# Patient Record
Sex: FEMALE | Race: WHITE | NOT HISPANIC OR LATINO | Employment: FULL TIME | URBAN - METROPOLITAN AREA
[De-identification: names, ages, dates, MRNs, and addresses within clinical notes are randomized per-mention and may not be internally consistent; named-entity substitution may affect disease eponyms.]

---

## 2023-08-25 ENCOUNTER — TELEPHONE (OUTPATIENT)
Dept: PRIMARY CARE | Facility: CLINIC | Age: 38
End: 2023-08-25

## 2023-08-25 DIAGNOSIS — Z00.00 ROUTINE GENERAL MEDICAL EXAMINATION AT A HEALTH CARE FACILITY: Primary | ICD-10-CM

## 2023-08-25 DIAGNOSIS — E55.9 VITAMIN D DEFICIENCY: ICD-10-CM

## 2023-09-25 ENCOUNTER — HOSPITAL ENCOUNTER (OUTPATIENT)
Dept: DATA CONVERSION | Facility: HOSPITAL | Age: 38
Discharge: HOME | End: 2023-09-25

## 2023-09-25 DIAGNOSIS — Z00.00 ENCOUNTER FOR GENERAL ADULT MEDICAL EXAMINATION WITHOUT ABNORMAL FINDINGS: ICD-10-CM

## 2023-09-25 DIAGNOSIS — E55.9 VITAMIN D DEFICIENCY, UNSPECIFIED: ICD-10-CM

## 2023-09-25 LAB
25(OH)D3 SERPL-MCNC: 31 NG/ML (ref 31–100)
ALBUMIN SERPL-MCNC: 4.4 GM/DL (ref 3.5–5)
ALBUMIN/GLOB SERPL: 1.4 RATIO (ref 1.5–3)
ALP BLD-CCNC: 59 U/L (ref 35–125)
ALT SERPL-CCNC: 21 U/L (ref 5–40)
ANION GAP SERPL CALCULATED.3IONS-SCNC: 9 MMOL/L (ref 0–19)
APPEARANCE PLAS: ABNORMAL
AST SERPL-CCNC: 18 U/L (ref 5–40)
BILIRUB SERPL-MCNC: 0.4 MG/DL (ref 0.1–1.2)
BUN SERPL-MCNC: 15 MG/DL (ref 8–25)
BUN/CREAT SERPL: 21.4 RATIO (ref 8–21)
CALCIUM SERPL-MCNC: 9.7 MG/DL (ref 8.5–10.4)
CHLORIDE SERPL-SCNC: 104 MMOL/L (ref 97–107)
CHOLEST SERPL-MCNC: 169 MG/DL (ref 133–200)
CHOLEST/HDLC SERPL: 3.6 RATIO
CO2 SERPL-SCNC: 26 MMOL/L (ref 24–31)
COLOR SPUN FLD: ABNORMAL
CREAT SERPL-MCNC: 0.7 MG/DL (ref 0.4–1.6)
DEPRECATED RDW RBC AUTO: 43 FL (ref 37–54)
ERYTHROCYTE [DISTWIDTH] IN BLOOD BY AUTOMATED COUNT: 12.5 % (ref 11.7–15)
FASTING STATUS PATIENT QL REPORTED: ABNORMAL
GFR SERPL CREATININE-BSD FRML MDRD: 116 ML/MIN/1.73 M2
GLOBULIN SER-MCNC: 3.2 G/DL (ref 1.9–3.7)
GLUCOSE SERPL-MCNC: 97 MG/DL (ref 65–99)
HBA1C MFR BLD: 5.4 % (ref 4–6)
HCT VFR BLD AUTO: 42.5 % (ref 36–44)
HDLC SERPL-MCNC: 47 MG/DL
HGB BLD-MCNC: 13.9 GM/DL (ref 12–15)
LDLC SERPL CALC-MCNC: 107 MG/DL (ref 65–130)
MCH RBC QN AUTO: 30.2 PG (ref 26–34)
MCHC RBC AUTO-ENTMCNC: 32.7 % (ref 31–37)
MCV RBC AUTO: 92.2 FL (ref 80–100)
PLATELET # BLD AUTO: 301 K/UL (ref 150–450)
PMV BLD AUTO: 10.2 CU (ref 7–12.6)
POTASSIUM SERPL-SCNC: 4.5 MMOL/L (ref 3.4–5.1)
PROT SERPL-MCNC: 7.6 G/DL (ref 5.9–7.9)
RBC # BLD AUTO: 4.61 M/UL (ref 4–4.9)
SODIUM SERPL-SCNC: 139 MMOL/L (ref 133–145)
TRIGL SERPL-MCNC: 75 MG/DL (ref 40–150)
TSH SERPL DL<=0.05 MIU/L-ACNC: 1.03 MIU/L (ref 0.27–4.2)
WBC # BLD AUTO: 5.5 K/UL (ref 4.5–11)

## 2023-09-27 NOTE — PROGRESS NOTES
"Subjective   Patient ID: Norah Khan is a 37 y.o. female who presents for Annual Exam.    Last Annual Physical: Sept 2022   Last Dental Visit: Scheduled   Last Eye exam: Up-to-date   Hearing Concerns: No  Diet: Well-balanced   Exercise Routine: Regular exercise       HPI   The patient reports that she is taking Prozac for OCD as prescribed and she has no issues with this medication. She is also taking a prenatal supplement as she is trying to conceive.     Review of Systems  Constitutional: No fever or chills, No Night Sweats  Eyes: No Blurry Vision or Eye sight problems  ENT: No Nasal Discharge, Hoarseness, sore throat  Cardiovascular: no chest pain, no palpitations and no syncope.   Respiratory: no cough, no shortness of breath during exertion and no shortness of breath at rest.   Gastrointestinal: no abdominal pain, no nausea and no vomiting.   : No vaginal discharge, burning with urination, no blood in urine or stools  Skin: No Skin rashes or Lesions  Neuro: No Headache, no dizziness or Numbness or tingling  Psych: No Anxiety, depression or sleeping problems  Heme: No Easy bleeding or brusing.     Objective   /79 (BP Location: Left arm, Patient Position: Sitting, BP Cuff Size: Adult)   Pulse 79   Resp 16   Ht 1.651 m (5' 5\")   Wt 66.7 kg (147 lb)   LMP 09/16/2023 (Exact Date)   SpO2 97%   BMI 24.46 kg/m²     Physical Exam  Patient declined Chaperone  Constitutional: Alert and in no acute distress. Well developed, well nourished.   Head and Face: Head and face: Normal.    Eyes: Normal external exam. Pupils were equal in size, round, reactive to light (PERRL) with normal accommodation and extraocular movements intact (EOMI).   Ears, Nose, Mouth, and Throat: External inspection of ears and nose: Normal.  Hearing: Normal.  Nasal mucosa, septum, and turbinates: Normal.  Lips, teeth, and gums: Normal.  Oropharynx: Normal.   Neck: No neck mass was observed. Supple. Thyroid not enlarged and there " were no palpable thyroid nodules.   Cardiovascular: Heart rate and rhythm were normal, normal S1 and S2. Pedal pulses: Normal. No peripheral edema.   Pulmonary: No respiratory distress. Clear bilateral breath sounds.   Breast: Normal Appearance, No Masses or lumps palpated  Abdomen: Soft nontender; no abdominal mass palpated. Normal bowel sounds. No organomegaly.   Musculoskeletal: No joint swelling seen, normal movements of all extremities. Range of motion: Normal.  Muscle strength/tone: Normal.    Skin: Normal skin color and pigmentation, normal skin turgor, and no rash.   Neurologic: Deep tendon reflexes were 2+ and symmetric.   Psychiatric: Judgment and insight: Intact. Mood and affect: Normal.  Lymphatic: No cervical lymphadenopathy. Palpation of lymph nodes in axillae: Normal.  Palpation of lymph nodes in groin: Normal.    Lab Results   Component Value Date    WBC 7.6 05/05/2022    HGB 13.4 05/05/2022    HCT 40.2 05/05/2022     (L) 05/05/2022           Assessment/Plan   Diagnoses and all orders for this visit:  Annual physical exam  Encounter for immunization  -     Flu vaccine (IIV4) age 6 months and greater, preservative free        Dear Norah Khan     It was my pleasure to take care of you today in the office. Below are the things we discussed today:    1. 1. Immunizations: Yearly Flu shot is recommended. Given today         a: COVID: Please get booster from the pharmacy          b: Tetanus: Up-to-date    2. Blood Work: Awaiting results   3. Seen your dentist twice a year  4. Yearly Eye exam is recommended    5. BMI: Normal  6: Diet recommendations:   Eat Clean, Try to have as many home cooked meals as possible  Avoid processed foods which contain excess calories, sugar, and sodium.    7. Exercise recommendations:   150 minutes a week to maintain your weight     If you have to loose weight, you need a better diet and exercise plan.     8. PAP - Up-to-date. Last done in July 2023. Cytology and  HPV negative.     9. Please get your Living will / Advance directive completed if you do not have one already. Please make sure our office has a copy of the latest one.     10. OCD: Continue Prozac.     11. Blood pressures: Advised the patient to measure ambulatory blood pressures at home when she is relaxed and keep a log of readings.    Follow up in one year for a Physical. Please call the office before your Physical to see if you need blood work completed prior to your physical.     Please call me if any questions arise from now until your next visit. I will call you after I am done seeing patients. A Doctor is always available by phone when the office is closed. Please feel free to call for help with any problem that you feel shouldn't wait until the office re-opens.     Scribe Attestation  By signing my name below, IDebra Scribe   attest that this documentation has been prepared under the direction and in the presence of Carlie France MD.

## 2023-09-28 ENCOUNTER — OFFICE VISIT (OUTPATIENT)
Dept: PRIMARY CARE | Facility: CLINIC | Age: 38
End: 2023-09-28
Payer: COMMERCIAL

## 2023-09-28 VITALS
OXYGEN SATURATION: 97 % | RESPIRATION RATE: 16 BRPM | SYSTOLIC BLOOD PRESSURE: 130 MMHG | HEART RATE: 79 BPM | DIASTOLIC BLOOD PRESSURE: 79 MMHG | WEIGHT: 147 LBS | HEIGHT: 65 IN | BODY MASS INDEX: 24.49 KG/M2

## 2023-09-28 DIAGNOSIS — Z00.00 ANNUAL PHYSICAL EXAM: Primary | ICD-10-CM

## 2023-09-28 DIAGNOSIS — Z23 ENCOUNTER FOR IMMUNIZATION: ICD-10-CM

## 2023-09-28 PROBLEM — F42.8 OBSESSIVE COMPULSIVE NEUROSIS: Status: ACTIVE | Noted: 2023-09-28

## 2023-09-28 PROCEDURE — 99395 PREV VISIT EST AGE 18-39: CPT | Performed by: FAMILY MEDICINE

## 2023-09-28 PROCEDURE — 3078F DIAST BP <80 MM HG: CPT | Performed by: FAMILY MEDICINE

## 2023-09-28 PROCEDURE — 3075F SYST BP GE 130 - 139MM HG: CPT | Performed by: FAMILY MEDICINE

## 2023-09-28 PROCEDURE — 1036F TOBACCO NON-USER: CPT | Performed by: FAMILY MEDICINE

## 2023-09-28 PROCEDURE — 90686 IIV4 VACC NO PRSV 0.5 ML IM: CPT | Performed by: FAMILY MEDICINE

## 2023-09-28 PROCEDURE — 90471 IMMUNIZATION ADMIN: CPT | Performed by: FAMILY MEDICINE

## 2023-09-28 RX ORDER — FLUOXETINE HYDROCHLORIDE 60 MG/1
1 TABLET, FILM COATED ORAL; ORAL DAILY
COMMUNITY
Start: 2022-10-05 | End: 2024-03-13 | Stop reason: SDUPTHER

## 2023-09-28 RX ORDER — BETA CAROTENE, CHOLECALCIFEROL, DL-ALPHA TOCOPHERYL ACETATE, ASCORBIC ACID, FOLIC ACID, THIAMIN MONONITRATE, RIBOFLAVIN, NIACINAMIDE, PYRIDOXINE HYDROCHLORIDE, CYANOCOBALAMIN, CALCIUM CARBONATE, POTAS 120; 4000; 200; 400; 8; 29; 1; 20; 150; 3; 3; 3; 15 MG/1; [IU]/1; MG/1; [IU]/1; UG/1; MG/1; MG/1; MG/1; UG/1; MG/1; MG/1; MG/1; MG/1
TABLET, FILM COATED ORAL
COMMUNITY

## 2023-09-28 ASSESSMENT — PATIENT HEALTH QUESTIONNAIRE - PHQ9
SUM OF ALL RESPONSES TO PHQ9 QUESTIONS 1 AND 2: 0
2. FEELING DOWN, DEPRESSED OR HOPELESS: NOT AT ALL
1. LITTLE INTEREST OR PLEASURE IN DOING THINGS: NOT AT ALL

## 2023-09-28 ASSESSMENT — COLUMBIA-SUICIDE SEVERITY RATING SCALE - C-SSRS
1. IN THE PAST MONTH, HAVE YOU WISHED YOU WERE DEAD OR WISHED YOU COULD GO TO SLEEP AND NOT WAKE UP?: NO
2. HAVE YOU ACTUALLY HAD ANY THOUGHTS OF KILLING YOURSELF?: NO

## 2023-10-12 PROBLEM — M62.81 MUSCLE WEAKNESS: Status: ACTIVE | Noted: 2023-10-12

## 2023-10-13 ENCOUNTER — TELEMEDICINE (OUTPATIENT)
Dept: BEHAVIORAL HEALTH | Facility: CLINIC | Age: 38
End: 2023-10-13
Payer: COMMERCIAL

## 2023-10-13 DIAGNOSIS — F41.9 ANXIETY: Primary | ICD-10-CM

## 2023-10-13 PROCEDURE — 90837 PSYTX W PT 60 MINUTES: CPT | Performed by: PSYCHOLOGIST

## 2023-10-13 NOTE — PATIENT INSTRUCTIONS
keep written list of anxiety triggers and avoided situations for in vivo exposure practice. Read handouts provided on OCD and acceptance and commitment therapy. See resources of mothertobaby.org and postpartum.net. complete values card sort activity. use SUDS anchor ratings to guide in vivo exposure. practice cognitive defusion as discussed. Follow-up in 2 months.  Use behavioral activation skills as discussed.

## 2023-10-13 NOTE — PROGRESS NOTES
Subjective   Patient ID: Winston Khan is a 37 y.o. female who presents for No chief complaint on file..    Virtual or Telephone Consent    An interactive audio and video telecommunication system which permits real time communications between the patient (at the originating site) and provider (at the distant site) was utilized to provide this telehealth service.   Verbal consent was requested and obtained from Winston Khan on this date, 10/13/23 for a telehealth visit.      Objective   Social History     Substance and Sexual Activity   Alcohol Use Yes    Alcohol/week: 3.0 standard drinks of alcohol    Types: 3 Standard drinks or equivalent per week      Social History     Social History Narrative    Not on file        Assessment/Plan   Problem List Items Addressed This Visit          Mental Health    Anxiety - Primary       START TIME: 2:05  END TIME: 3p  TOTAL face to face time: 55 minutes    This was a followup appointment (session 8) between provider and patient to address symptoms of postpartum anxiety and OCD. winston is still seeing Dr. Walsh for medication (fluoxetine and reported positive effect).          Winston reported generally continued improved anxiety, though noted increase in context of two current/upcoming psychosocial stressors.  She discussed her use of appropriate gradual exposure and cognitive restructuring skills and provider gave positive feedback about this skill use. Provider gave psychoeducation on behavioral activation skill and paced, scheduled engagement in pleasant, values, mastery based activities (CBT for depression skill).  Handouts were provided. Winston identified her goals in relation to behavioral activation in context of adjustment to being laid off from work.  provider also gave supportive counseling and normalized common emotional experiences.         PLAN: Plan made for individual CBT to address OCD on monthly basis. She accepted referral to Dr. Travis Walsh for  medication management.

## 2024-01-12 ENCOUNTER — INITIAL PRENATAL (OUTPATIENT)
Dept: OBSTETRICS AND GYNECOLOGY | Facility: CLINIC | Age: 39
End: 2024-01-12
Payer: COMMERCIAL

## 2024-01-12 VITALS — WEIGHT: 151.3 LBS | SYSTOLIC BLOOD PRESSURE: 128 MMHG | BODY MASS INDEX: 25.18 KG/M2 | DIASTOLIC BLOOD PRESSURE: 80 MMHG

## 2024-01-12 DIAGNOSIS — Z3A.09 9 WEEKS GESTATION OF PREGNANCY (HHS-HCC): ICD-10-CM

## 2024-01-12 DIAGNOSIS — O09.521 MULTIGRAVIDA OF ADVANCED MATERNAL AGE IN FIRST TRIMESTER (HHS-HCC): Primary | ICD-10-CM

## 2024-01-12 DIAGNOSIS — Z32.01 PREGNANCY TEST POSITIVE (HHS-HCC): ICD-10-CM

## 2024-01-12 LAB — PREGNANCY TEST URINE, POC: POSITIVE

## 2024-01-12 PROCEDURE — 81025 URINE PREGNANCY TEST: CPT | Performed by: OBSTETRICS & GYNECOLOGY

## 2024-01-12 PROCEDURE — 91320 SARSCV2 VAC 30MCG TRS-SUC IM: CPT | Performed by: OBSTETRICS & GYNECOLOGY

## 2024-01-12 PROCEDURE — 87086 URINE CULTURE/COLONY COUNT: CPT | Performed by: OBSTETRICS & GYNECOLOGY

## 2024-01-12 PROCEDURE — 87800 DETECT AGNT MULT DNA DIREC: CPT | Performed by: OBSTETRICS & GYNECOLOGY

## 2024-01-12 PROCEDURE — 0500F INITIAL PRENATAL CARE VISIT: CPT | Performed by: OBSTETRICS & GYNECOLOGY

## 2024-01-12 ASSESSMENT — EDINBURGH POSTNATAL DEPRESSION SCALE (EPDS)
I HAVE BEEN SO UNHAPPY THAT I HAVE BEEN CRYING: NO, NEVER
THINGS HAVE BEEN GETTING ON TOP OF ME: NO, I HAVE BEEN COPING AS WELL AS EVER
I HAVE BEEN ANXIOUS OR WORRIED FOR NO GOOD REASON: NO, NOT AT ALL
I HAVE LOOKED FORWARD WITH ENJOYMENT TO THINGS: AS MUCH AS I EVER DID
I HAVE BEEN SO UNHAPPY THAT I HAVE HAD DIFFICULTY SLEEPING: NOT AT ALL
TOTAL SCORE: 0
THE THOUGHT OF HARMING MYSELF HAS OCCURRED TO ME: NEVER
I HAVE FELT SAD OR MISERABLE: NO, NOT AT ALL
I HAVE BLAMED MYSELF UNNECESSARILY WHEN THINGS WENT WRONG: NO, NEVER
I HAVE FELT SCARED OR PANICKY FOR NO GOOD REASON: NO, NOT AT ALL
I HAVE BEEN ABLE TO LAUGH AND SEE THE FUNNY SIDE OF THINGS: AS MUCH AS I ALWAYS COULD

## 2024-01-12 NOTE — PROGRESS NOTES
here to establish prenatal care      pregnancy notable for:  - history of anxiety, OCD -- established with  mental health, on prozac which is working well  - AMA, multigravida  - history of postpartum HTN, no meds    plan:  1)  prenatal labs discussed.  requisitions given  2)  discussed aneuploidy screening  3)  oriented to practice, frequency of visits.    reviewed reasons/number to call if has questions.  4)  comorbidities:  - AMA -- desires aneuploidy screening, to be drawn after 10 wks  - history of PP HTN -- discussed bASA for risk-reduction  - anxiety, OCD -- continue with prozac and therapy  5)  preventive health  - cervical cancer screening up to date as of 2023  - vaccines covid booster today

## 2024-01-12 NOTE — LETTER
1/12/2024    To Whom It May Concern:    Norah Khan is being followed for her pregnancy at [facility].  Estimated Date of Delivery: 8/15/24    Sincerely,        Kelly Regan MD  [facility]

## 2024-01-13 LAB
C TRACH RRNA SPEC QL NAA+PROBE: NEGATIVE
N GONORRHOEA DNA SPEC QL PROBE+SIG AMP: NEGATIVE

## 2024-01-14 LAB — BACTERIA UR CULT: NORMAL

## 2024-01-19 ENCOUNTER — TELEMEDICINE (OUTPATIENT)
Dept: BEHAVIORAL HEALTH | Facility: CLINIC | Age: 39
End: 2024-01-19
Payer: COMMERCIAL

## 2024-01-19 DIAGNOSIS — F41.1 GENERALIZED ANXIETY DISORDER: Primary | ICD-10-CM

## 2024-01-19 DIAGNOSIS — F42.8 OBSESSIVE COMPULSIVE NEUROSIS: ICD-10-CM

## 2024-01-19 PROCEDURE — 90834 PSYTX W PT 45 MINUTES: CPT | Performed by: PSYCHOLOGIST

## 2024-01-19 NOTE — PROGRESS NOTES
START TIME: 8:07  END TIME:8:57  TOTAL TIME FACE TO FACE: 50mins    Subjective   Patient ID: Winston Khan is a 38 y.o. female who presents for   Problem List Items Addressed This Visit       Obsessive compulsive neurosis    Generalized anxiety disorder - Primary   .    Virtual or Telephone Consent    An interactive audio and video telecommunication system which permits real time communications between the patient (at the originating site) and provider (at the distant site) was utilized to provide this telehealth service.   Verbal consent was requested and obtained from Winston Khan on this date, 01/19/24 for a telehealth visit.      CONTENT OF SESSION: This was a followup appointment (session 9) between provider and patient to address symptoms of postpartum anxiety and OCD. winston is still seeing Dr. Walsh for medication management with positive effect.    Focus of session was on Winston's adjustment to current pregnancy (around 10 wga) and unemployment. 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 Provider gave psychoeducation on research survey from [employer]. Provider gave psychoeducation on cognitive defusion, cognitive restructuring, worry time and time was spent on problem solving around application of these skills in context of current stress. Winston also identified her values to inform her decision making. She reported overall improvement in anxiety symptoms. Provider gave supportive counseling and normalized common emotional experiences.      Objective   Social History     Substance and Sexual Activity   Alcohol Use Yes    Alcohol/week: 3.0 standard drinks of alcohol    Types: 3 Standard drinks or equivalent per week      Social History     Social History Narrative    Not on file        Assessment/Plan   Problem List Items Addressed This Visit       Obsessive compulsive neurosis    Generalized anxiety disorder - Primary   PLAN: Plan made for  individual CBT to address OCD on monthly basis. She will continue with Dr. Travis Walsh for medication management.

## 2024-01-23 ENCOUNTER — LAB (OUTPATIENT)
Dept: LAB | Facility: LAB | Age: 39
End: 2024-01-23
Payer: COMMERCIAL

## 2024-01-23 DIAGNOSIS — Z00.00 ROUTINE GENERAL MEDICAL EXAMINATION AT A HEALTH CARE FACILITY: ICD-10-CM

## 2024-01-23 DIAGNOSIS — Z3A.09 9 WEEKS GESTATION OF PREGNANCY (HHS-HCC): ICD-10-CM

## 2024-01-23 DIAGNOSIS — O09.521 MULTIGRAVIDA OF ADVANCED MATERNAL AGE IN FIRST TRIMESTER (HHS-HCC): ICD-10-CM

## 2024-01-23 DIAGNOSIS — E55.9 VITAMIN D DEFICIENCY: ICD-10-CM

## 2024-01-23 LAB
25(OH)D3 SERPL-MCNC: 31 NG/ML (ref 30–100)
ABO GROUP (TYPE) IN BLOOD: NORMAL
ALBUMIN SERPL BCP-MCNC: 3.9 G/DL (ref 3.4–5)
ALP SERPL-CCNC: 48 U/L (ref 33–110)
ALT SERPL W P-5'-P-CCNC: 10 U/L (ref 7–45)
ANION GAP SERPL CALC-SCNC: 14 MMOL/L (ref 10–20)
ANTIBODY SCREEN: NORMAL
AST SERPL W P-5'-P-CCNC: 12 U/L (ref 9–39)
BILIRUB SERPL-MCNC: 0.3 MG/DL (ref 0–1.2)
BUN SERPL-MCNC: 9 MG/DL (ref 6–23)
CALCIUM SERPL-MCNC: 9.7 MG/DL (ref 8.6–10.6)
CHLORIDE SERPL-SCNC: 102 MMOL/L (ref 98–107)
CHOLEST SERPL-MCNC: 215 MG/DL (ref 0–199)
CHOLESTEROL/HDL RATIO: 3.4
CO2 SERPL-SCNC: 23 MMOL/L (ref 21–32)
CREAT SERPL-MCNC: 0.46 MG/DL (ref 0.5–1.05)
EGFRCR SERPLBLD CKD-EPI 2021: >90 ML/MIN/1.73M*2
ERYTHROCYTE [DISTWIDTH] IN BLOOD BY AUTOMATED COUNT: 12.7 % (ref 11.5–14.5)
EST. AVERAGE GLUCOSE BLD GHB EST-MCNC: 103 MG/DL
GLUCOSE SERPL-MCNC: 71 MG/DL (ref 74–99)
HBA1C MFR BLD: 5.2 %
HBV SURFACE AG SERPL QL IA: NONREACTIVE
HCT VFR BLD AUTO: 38.1 % (ref 36–46)
HCV AB SER QL: NONREACTIVE
HDLC SERPL-MCNC: 62.4 MG/DL
HGB BLD-MCNC: 13 G/DL (ref 12–16)
HIV 1+2 AB+HIV1 P24 AG SERPL QL IA: NONREACTIVE
LDLC SERPL CALC-MCNC: 118 MG/DL
MCH RBC QN AUTO: 30.4 PG (ref 26–34)
MCHC RBC AUTO-ENTMCNC: 34.1 G/DL (ref 32–36)
MCV RBC AUTO: 89 FL (ref 80–100)
NON HDL CHOLESTEROL: 153 MG/DL (ref 0–149)
NRBC BLD-RTO: 0 /100 WBCS (ref 0–0)
PLATELET # BLD AUTO: 289 X10*3/UL (ref 150–450)
POTASSIUM SERPL-SCNC: 4 MMOL/L (ref 3.5–5.3)
PROT SERPL-MCNC: 6.5 G/DL (ref 6.4–8.2)
RBC # BLD AUTO: 4.27 X10*6/UL (ref 4–5.2)
REFLEX ADDED, ANEMIA PANEL: NORMAL
RH FACTOR (ANTIGEN D): NORMAL
RUBV IGG SERPL IA-ACNC: 1 IA
RUBV IGG SERPL QL IA: POSITIVE
SODIUM SERPL-SCNC: 135 MMOL/L (ref 136–145)
TREPONEMA PALLIDUM IGG+IGM AB [PRESENCE] IN SERUM OR PLASMA BY IMMUNOASSAY: NONREACTIVE
TRIGL SERPL-MCNC: 174 MG/DL (ref 0–149)
TSH SERPL-ACNC: 0.8 MIU/L (ref 0.44–3.98)
VLDL: 35 MG/DL (ref 0–40)
WBC # BLD AUTO: 8.5 X10*3/UL (ref 4.4–11.3)

## 2024-01-23 PROCEDURE — 80061 LIPID PANEL: CPT

## 2024-01-23 PROCEDURE — 83036 HEMOGLOBIN GLYCOSYLATED A1C: CPT

## 2024-01-23 PROCEDURE — 86850 RBC ANTIBODY SCREEN: CPT

## 2024-01-23 PROCEDURE — 86780 TREPONEMA PALLIDUM: CPT

## 2024-01-23 PROCEDURE — 86900 BLOOD TYPING SEROLOGIC ABO: CPT

## 2024-01-23 PROCEDURE — 82306 VITAMIN D 25 HYDROXY: CPT

## 2024-01-23 PROCEDURE — 86901 BLOOD TYPING SEROLOGIC RH(D): CPT

## 2024-01-23 PROCEDURE — 36415 COLL VENOUS BLD VENIPUNCTURE: CPT

## 2024-01-23 PROCEDURE — 86317 IMMUNOASSAY INFECTIOUS AGENT: CPT

## 2024-01-23 PROCEDURE — 87340 HEPATITIS B SURFACE AG IA: CPT

## 2024-01-23 PROCEDURE — 80053 COMPREHEN METABOLIC PANEL: CPT

## 2024-01-23 PROCEDURE — 84443 ASSAY THYROID STIM HORMONE: CPT

## 2024-01-23 PROCEDURE — 87389 HIV-1 AG W/HIV-1&-2 AB AG IA: CPT

## 2024-01-23 PROCEDURE — 86803 HEPATITIS C AB TEST: CPT

## 2024-01-23 PROCEDURE — 85027 COMPLETE CBC AUTOMATED: CPT

## 2024-02-02 ENCOUNTER — HOSPITAL ENCOUNTER (OUTPATIENT)
Dept: RADIOLOGY | Facility: CLINIC | Age: 39
Discharge: HOME | End: 2024-02-02
Payer: COMMERCIAL

## 2024-02-02 DIAGNOSIS — Z3A.09 9 WEEKS GESTATION OF PREGNANCY (HHS-HCC): ICD-10-CM

## 2024-02-02 DIAGNOSIS — O09.521 MULTIGRAVIDA OF ADVANCED MATERNAL AGE IN FIRST TRIMESTER (HHS-HCC): ICD-10-CM

## 2024-02-02 PROCEDURE — 76813 OB US NUCHAL MEAS 1 GEST: CPT | Performed by: STUDENT IN AN ORGANIZED HEALTH CARE EDUCATION/TRAINING PROGRAM

## 2024-02-02 PROCEDURE — 76813 OB US NUCHAL MEAS 1 GEST: CPT

## 2024-02-05 ENCOUNTER — TELEPHONE (OUTPATIENT)
Dept: OBSTETRICS AND GYNECOLOGY | Facility: CLINIC | Age: 39
End: 2024-02-05
Payer: COMMERCIAL

## 2024-02-05 NOTE — TELEPHONE ENCOUNTER
Call from pt developed a rash on abdomen and underarms that is spreading to face, + itching seems like hives. Pt started low dose ASA on 2/1/24 Does not remember if she has ever taken aspirin before, Discussed with Dr Gilbert, stop ASA Apt scheduled with Dr Regan 2/7/24 Instructed pt to call office if rash worsens, 911 or go to Emergency room for any SOB.  Can use cool cloths, unscented lotion Try in a small area first, for symptom relief. Verbalizes understanding and agrees

## 2024-02-07 ENCOUNTER — ROUTINE PRENATAL (OUTPATIENT)
Dept: OBSTETRICS AND GYNECOLOGY | Facility: CLINIC | Age: 39
End: 2024-02-07
Payer: COMMERCIAL

## 2024-02-07 VITALS
DIASTOLIC BLOOD PRESSURE: 77 MMHG | HEART RATE: 118 BPM | BODY MASS INDEX: 25.79 KG/M2 | SYSTOLIC BLOOD PRESSURE: 123 MMHG | WEIGHT: 155 LBS

## 2024-02-07 DIAGNOSIS — O09.91 SUPERVISION OF HIGH RISK PREGNANCY IN FIRST TRIMESTER (HHS-HCC): ICD-10-CM

## 2024-02-07 DIAGNOSIS — Z3A.12 12 WEEKS GESTATION OF PREGNANCY (HHS-HCC): ICD-10-CM

## 2024-02-07 DIAGNOSIS — T78.40XA ALLERGIC REACTION, INITIAL ENCOUNTER: Primary | ICD-10-CM

## 2024-02-07 PROCEDURE — 0501F PRENATAL FLOW SHEET: CPT | Performed by: OBSTETRICS & GYNECOLOGY

## 2024-02-07 ASSESSMENT — PAIN SCALES - GENERAL: PAINLEVEL_OUTOF10: 0 - NO PAIN

## 2024-02-07 ASSESSMENT — PAIN - FUNCTIONAL ASSESSMENT: PAIN_FUNCTIONAL_ASSESSMENT: 0-10

## 2024-02-07 NOTE — PROGRESS NOTES
2/4 - went to bed with hives on abd and axillae  Woke up, more disseminated  Only new thing, started on aspirin, but that was 3-4 days prior  Itching, not terrible  Moved to upper chest and arms  Took sudafed, which helped  This morning, much improved  Had stopped taking the aspirin  Didn't take aspirin as a young child  No new foods  No throat swelling/itching    Going on a trip tomorrow, will trial aspirin when returns  Discussed meds to use if hives recur - claritin, famotidine

## 2024-02-13 DIAGNOSIS — O21.9 NAUSEA AND VOMITING IN PREGNANCY PRIOR TO 22 WEEKS GESTATION (HHS-HCC): Primary | ICD-10-CM

## 2024-02-13 RX ORDER — ONDANSETRON 4 MG/1
4 TABLET, ORALLY DISINTEGRATING ORAL EVERY 8 HOURS PRN
Qty: 20 TABLET | Refills: 1 | Status: SHIPPED | OUTPATIENT
Start: 2024-02-13 | End: 2024-02-20

## 2024-02-22 ENCOUNTER — APPOINTMENT (OUTPATIENT)
Dept: BEHAVIORAL HEALTH | Facility: CLINIC | Age: 39
End: 2024-02-22
Payer: COMMERCIAL

## 2024-02-27 ENCOUNTER — TELEMEDICINE (OUTPATIENT)
Dept: BEHAVIORAL HEALTH | Facility: CLINIC | Age: 39
End: 2024-02-27
Payer: COMMERCIAL

## 2024-02-27 DIAGNOSIS — F41.1 GENERALIZED ANXIETY DISORDER: Primary | ICD-10-CM

## 2024-02-27 PROCEDURE — 90834 PSYTX W PT 45 MINUTES: CPT | Performed by: PSYCHOLOGIST

## 2024-03-08 ENCOUNTER — ROUTINE PRENATAL (OUTPATIENT)
Dept: OBSTETRICS AND GYNECOLOGY | Facility: CLINIC | Age: 39
End: 2024-03-08
Payer: COMMERCIAL

## 2024-03-08 VITALS — SYSTOLIC BLOOD PRESSURE: 106 MMHG | DIASTOLIC BLOOD PRESSURE: 64 MMHG | WEIGHT: 158 LBS | BODY MASS INDEX: 26.29 KG/M2

## 2024-03-08 DIAGNOSIS — Z34.92 PRENATAL CARE, SECOND TRIMESTER (HHS-HCC): Primary | ICD-10-CM

## 2024-03-08 DIAGNOSIS — Z3A.17 17 WEEKS GESTATION OF PREGNANCY (HHS-HCC): ICD-10-CM

## 2024-03-08 PROBLEM — O09.522 MULTIGRAVIDA OF ADVANCED MATERNAL AGE IN SECOND TRIMESTER (HHS-HCC): Status: ACTIVE | Noted: 2024-03-08

## 2024-03-08 PROBLEM — M62.81 MUSCLE WEAKNESS: Status: RESOLVED | Noted: 2023-10-12 | Resolved: 2024-03-08

## 2024-03-08 PROCEDURE — 0501F PRENATAL FLOW SHEET: CPT | Performed by: OBSTETRICS & GYNECOLOGY

## 2024-03-08 RX ORDER — ASPIRIN 81 MG/1
162 TABLET ORAL DAILY
COMMUNITY

## 2024-03-13 ENCOUNTER — TELEMEDICINE (OUTPATIENT)
Dept: BEHAVIORAL HEALTH | Facility: CLINIC | Age: 39
End: 2024-03-13
Payer: COMMERCIAL

## 2024-03-13 DIAGNOSIS — F41.1 GENERALIZED ANXIETY DISORDER: ICD-10-CM

## 2024-03-13 DIAGNOSIS — F42.8 OBSESSIVE COMPULSIVE NEUROSIS: ICD-10-CM

## 2024-03-13 PROCEDURE — 99214 OFFICE O/P EST MOD 30 MIN: CPT | Performed by: STUDENT IN AN ORGANIZED HEALTH CARE EDUCATION/TRAINING PROGRAM

## 2024-03-13 RX ORDER — FLUOXETINE HYDROCHLORIDE 60 MG/1
60 TABLET, FILM COATED ORAL; ORAL DAILY
Qty: 90 TABLET | Refills: 3 | Status: SHIPPED | OUTPATIENT
Start: 2024-03-13 | End: 2025-03-13

## 2024-03-13 NOTE — PROGRESS NOTES
"Subjective    All Individuals Present: Patient and Provider (Encounter Provider)     ID: Norah Khan is a 38 y.o. female with history of OCD and anxiety.     Interval History/HPI/PFSH:  @18wga (KIMBERLY 8/2024)    Difficult first trimester, a lot more fatigue and nausea.- Napping frequently. Mentally and emotionally has felt stable. Some weepiness    Got laid off from job end of 2023. Interviewing currently.    Daughter developing well.    Overall feels like her mood and anxiety are stable and under control.    Denies SI/HI/AVH.       Medication side effects: None Reported     Review of Systems  Constitutional: Positive for fatigue, Negative for fevers and weight loss  Psychiatric: Positive for anxiety and fatigue, Negative for decreased appetite, depression, irritability, loss of interest in favorite activities, mood swings, and sleep disturbance  Neurological: Negative   Other: @18wga, nausea    Objective   LMP 11/09/2023 (Exact Date)   Wt Readings from Last 4 Encounters:   03/08/24 71.7 kg (158 lb)   02/07/24 70.3 kg (155 lb)   01/12/24 68.6 kg (151 lb 4.8 oz)   09/28/23 66.7 kg (147 lb)       Mental Status Exam  General Appearance: Well groomed, appropriate eye contact.  Attitude/Behavior: Cooperative, conversant, engaged, and with good eye contact.  Motor: No psychomotor agitation or retardation, no tremor or other abnormal movements.  Speech: Normal rate, volume, prosody  Gait/Station: Within normal limits  Mood: \"okay.\" \"Tired\"  Affect: Euthymic, full-range and Congruent with mood and topic of conversation  Thought Process: Linear, goal directed  Thought Associations: No loosening of associations  Thought Content: normal  Sensorium: Alert and oriented to person, place, time and situation  Insight: Intact, as evidenced by awareness of symptom patterns  Judgment: Intact  Cognition: Cognitively intact to conversational testing with respect to attention, orientation, fund of knowledge, recent and remote memory, " and language.  Testing: N/A.    Laboratory/Imaging/Diagnostic Tests       Assessment/Plan   Overall Formulation and Differential Diagnosis:  Norah Khan is a 38 y.o. female who meets criteria for OCD and MELA.  Interval Assessment:  Past psychiatric history of OCD and anxiety with postpartum exacerbation, now at 12 months postpartum (delivery date 5/6/2022) who [facility]-up. She is prescribed Fluoxetine 60mg PO daily for management of symptoms.      3/13/24: OCD symptoms, mood well-controlled. @18wga, some fatigue and nausea with pregnancy. No need for fluoxetine dose change currently, will continue to monitor as pregnancy progresses.     Suicide/Violence Risk Assessment:  Although patient has risk factor of anxiety, patient has multiple protective factors including child-related concerns, treatment adherence, employment, social supports, help-seeking behavior, no prior suicide attempts, no prior history of homicidal violence, no firearms access, absence of substance use disorder, absence of suicidal ideations, absence of homicidal ideations, and future orientation, making the patient's risk of harm to self or others acutely low and chronically low.      Impression:  OCD with peripartum exacerbation  Anxiety disorder, unspecified     PLAN:  - Continue Fluoxetine 60 mg PO daily for OCD and anxiety symptoms. Denies adverse effects. 90 day script with refills available at pt's outpatient pharmacy.   - Continue psychotherapy with Dr. Yoko Singh (exposure-response prevention therapy) as schedule allows  - Provided psychoeducation regarding psychiatric diagnoses, medications, and indicated treatments     FOLLOW-UP: 4 weeks, or sooner if new or worsening symptoms  Risk Assessment:  Imminent Risk of Suicide or Serious Self-Injury: Low Risk -- Risk factors include: Severe anxiety Protective factors include:Denies current suicidal ideation, Denies history of suicide  attempts , Future-oriented talk , Willingness to seek help and support , Skills in problem solving, conflict resolution, and nonviolent handling of disputes, Cultural and Qu[employer] beliefs that discourage suicide and support self-preservation , Access to a variety of clinical interventions , Receiving and engaged in care for mental, physical, and substance use disorders , History of adhering to treatment recommendations and/or prescribed medication regimen , Support through ongoing medical and mental healthcare relationships , Current/history of good response to treatment/meds , Interpersonal relationships and supports, e.g., family, friends, peers, community , and Restricted access to firearms or other lethal means of suicide   Imminent Risk of Violence or Homicide: Low Risk - Risk factors include: No significant risk factors identified on screening. Protective factors include: Lack of known history of harm to others , Lack of known history of violent ideation , Lack of known access to firearms , Sense of community, availability/access to resources and support , Sense of optimism, hope , Interpersonal competence , Affect regulation , Sense of self-efficacy, internal locus of control , and Positive, pro-social family/peer network   Treatment Plan:  There are no recently modified care plans to display for this patient.      Attestation Statements   Topics (in addition those noted above): N/A - E&M coding by complexity of care. , Diagnostic impressions, Importance of adherence to treatment , Instructions for treatment , Patient education , Risks and benefits of treatments , and Side effects of medications

## 2024-03-21 ENCOUNTER — HOSPITAL ENCOUNTER (OUTPATIENT)
Dept: RADIOLOGY | Facility: CLINIC | Age: 39
Discharge: HOME | End: 2024-03-21
Payer: COMMERCIAL

## 2024-03-21 DIAGNOSIS — O09.521 MULTIGRAVIDA OF ADVANCED MATERNAL AGE IN FIRST TRIMESTER (HHS-HCC): ICD-10-CM

## 2024-03-21 DIAGNOSIS — Z3A.09 9 WEEKS GESTATION OF PREGNANCY (HHS-HCC): ICD-10-CM

## 2024-03-21 PROCEDURE — 76811 OB US DETAILED SNGL FETUS: CPT | Performed by: STUDENT IN AN ORGANIZED HEALTH CARE EDUCATION/TRAINING PROGRAM

## 2024-03-21 PROCEDURE — 76811 OB US DETAILED SNGL FETUS: CPT

## 2024-03-21 PROCEDURE — 76817 TRANSVAGINAL US OBSTETRIC: CPT

## 2024-03-21 PROCEDURE — 76817 TRANSVAGINAL US OBSTETRIC: CPT | Performed by: STUDENT IN AN ORGANIZED HEALTH CARE EDUCATION/TRAINING PROGRAM

## 2024-03-22 ENCOUNTER — TELEMEDICINE (OUTPATIENT)
Dept: BEHAVIORAL HEALTH | Facility: CLINIC | Age: 39
End: 2024-03-22
Payer: COMMERCIAL

## 2024-03-22 DIAGNOSIS — F41.1 GENERALIZED ANXIETY DISORDER: Primary | ICD-10-CM

## 2024-03-22 PROCEDURE — 90837 PSYTX W PT 60 MINUTES: CPT | Performed by: PSYCHOLOGIST

## 2024-03-22 NOTE — PROGRESS NOTES
START TIME: 1:05   END TIME:2  TOTAL TIME FACE TO FACE: 55mins    Subjective   Patient ID: Winston Khan is a 38 y.o. female who presents for   Problem List Items Addressed This Visit       Generalized anxiety disorder - Primary   .    Virtual or Telephone Consent    An interactive audio and video telecommunication system which permits real time communications between the patient (at the originating site) and provider (at the distant site) was utilized to provide this telehealth service.   Verbal consent was requested and obtained from Winston Khan on this date, 03/22/24 for a telehealth visit.      CONTENT OF SESSION:  This was a followup appointment (session 11) between provider and patient to address symptoms of postpartum anxiety and OCD. winston is still seeing Dr. Walsh for medication management with positive effect.     Focus of session was on Winston's adjustment to current pregnancy (around 19 wga). She had her 19 week scan and reported baby looks healthy; she was diagnosed with placenta previa and advised to abstain from intercourse. Provider gave psychoeducation on resources for non-intercourse intimacy building; e.g., Dr. Porsha Rodríguez's website. Winston described her experience with lower anxiety overall and decreased worry thoughts. She described her use of cognitive restructuring skills and provider gave positive feedback about this skill use. Winston also expressed her goal to have some more alone/self-care time. Provider gave supportive counseling and normalized common emotional experiences.    Objective   Social History     Substance and Sexual Activity   Alcohol Use Yes    Alcohol/week: 3.0 standard drinks of alcohol    Types: 3 Standard drinks or equivalent per week      Social History     Social History Narrative    Not on file        Assessment/Plan   Problem List Items Addressed This Visit       Generalized anxiety disorder - Primary     PLAN: Plan made for individual CBT to  address OCD on monthly basis. She will continue with Dr. Travis Walsh for medication management.

## 2024-04-02 PROBLEM — O44.02 PLACENTA PREVIA IN SECOND TRIMESTER (HHS-HCC): Status: ACTIVE | Noted: 2024-04-02

## 2024-04-05 ENCOUNTER — ROUTINE PRENATAL (OUTPATIENT)
Dept: OBSTETRICS AND GYNECOLOGY | Facility: CLINIC | Age: 39
End: 2024-04-05
Payer: COMMERCIAL

## 2024-04-05 VITALS
SYSTOLIC BLOOD PRESSURE: 111 MMHG | HEART RATE: 80 BPM | DIASTOLIC BLOOD PRESSURE: 74 MMHG | BODY MASS INDEX: 27.36 KG/M2 | WEIGHT: 164.4 LBS

## 2024-04-05 DIAGNOSIS — O09.522 MULTIGRAVIDA OF ADVANCED MATERNAL AGE IN SECOND TRIMESTER (HHS-HCC): ICD-10-CM

## 2024-04-05 DIAGNOSIS — O44.02 PLACENTA PREVIA IN SECOND TRIMESTER (HHS-HCC): Primary | ICD-10-CM

## 2024-04-05 DIAGNOSIS — O09.92 SUPERVISION OF HIGH RISK PREGNANCY IN SECOND TRIMESTER (HHS-HCC): ICD-10-CM

## 2024-04-05 DIAGNOSIS — Z3A.21 21 WEEKS GESTATION OF PREGNANCY (HHS-HCC): ICD-10-CM

## 2024-04-05 PROCEDURE — 0501F PRENATAL FLOW SHEET: CPT | Performed by: OBSTETRICS & GYNECOLOGY

## 2024-04-05 ASSESSMENT — PAIN - FUNCTIONAL ASSESSMENT: PAIN_FUNCTIONAL_ASSESSMENT: 0-10

## 2024-04-05 ASSESSMENT — PAIN SCALES - GENERAL: PAINLEVEL_OUTOF10: 0 - NO PAIN

## 2024-04-05 NOTE — PROGRESS NOTES
Problem List Items Addressed This Visit          Pregnancy    Multigravida of advanced maternal age in second trimester    Placenta previa in second trimester    Overview     Diagnosed @ 19+ wks 3/21/24 (anatomy scan)  Anterior placenta  No history of prior uterine surgery  Had spotting prior to diagnosis of placenta previa at time of anatomy scan          Has follow-up ultrasound @ 30 wks to determine location of placenta  Reviewed things to watch for, reasons to call/come in for evaluation

## 2024-04-09 PROBLEM — Z87.59 HISTORY OF POSTPARTUM HYPERTENSION: Status: ACTIVE | Noted: 2024-04-09

## 2024-04-09 PROBLEM — Z86.79 HISTORY OF POSTPARTUM HYPERTENSION: Status: ACTIVE | Noted: 2024-04-09

## 2024-04-10 ENCOUNTER — OFFICE VISIT (OUTPATIENT)
Dept: BEHAVIORAL HEALTH | Facility: CLINIC | Age: 39
End: 2024-04-10

## 2024-04-10 DIAGNOSIS — F42.8 OBSESSIVE COMPULSIVE NEUROSIS: ICD-10-CM

## 2024-04-10 DIAGNOSIS — F41.1 GENERALIZED ANXIETY DISORDER: ICD-10-CM

## 2024-04-10 PROCEDURE — 99214 OFFICE O/P EST MOD 30 MIN: CPT | Mod: GT,AM,95 | Performed by: STUDENT IN AN ORGANIZED HEALTH CARE EDUCATION/TRAINING PROGRAM

## 2024-04-10 NOTE — PROGRESS NOTES
"Subjective    All Individuals Present: Patient and Provider (Encounter Provider)     ID: Norah Khan is a 38 y.o. female with history of OCD and anxiety.     Interval History/HPI/PFSH:  @21wga (KIMBERLY 8/2024)    Got new job and starts next week, excited. This has been major stress relief.    Less fatigue overall, still napping. Less nausea.    Dx w/ placenta previa, working on some radical acceptance. Not freaking out. Feels stable and euthymic.    Seeing Dr. Singh less frequently.    About to have daughter's 2nd birthday party.    Overall feels like her mood and anxiety are stable and under control.    Denies SI/HI/AVH.       Medication side effects: None Reported     Review of Systems  Constitutional: Positive for fatigue, Negative for fevers and weight loss  Psychiatric: Positive for anxiety and fatigue, Negative for decreased appetite, depression, irritability, loss of interest in favorite activities, mood swings, and sleep disturbance  Neurological: Negative   Other: @21wga, nausea    Objective   LMP 11/09/2023 (Exact Date)   Wt Readings from Last 4 Encounters:   05/29/24 78 kg (172 lb)   05/03/24 72.6 kg (160 lb)   04/05/24 74.6 kg (164 lb 6.4 oz)   03/08/24 71.7 kg (158 lb)       Mental Status Exam  General Appearance: Well groomed, appropriate eye contact.  Attitude/Behavior: Cooperative, conversant, engaged, and with good eye contact.  Motor: No psychomotor agitation or retardation, no tremor or other abnormal movements.  Speech: Normal rate, volume, prosody  Gait/Station: Within normal limits  Mood: \"some relief\"  Affect: Euthymic, full-range and Congruent with mood and topic of conversation  Thought Process: Linear, goal directed  Thought Associations: No loosening of associations  Thought Content: normal  Sensorium: Alert and oriented to person, place, time and situation  Insight: Intact, as evidenced by awareness of symptom patterns  Judgment: Intact  Cognition: Cognitively intact to conversational " testing with respect to attention, orientation, fund of knowledge, recent and remote memory, and language.  Testing: N/A.    Laboratory/Imaging/Diagnostic Tests       Assessment/Plan   Overall Formulation and Differential Diagnosis:  Norah Khan is a 38 y.o. female who meets criteria for OCD and MELA.  Interval Assessment:  Past psychiatric history of OCD and anxiety with postpartum exacerbation, now >12 months postpartum (delivery date 5/6/2022) who [facility] for psychiatric follow-up in current pregnancy @21wga. She is prescribed Fluoxetine 60mg PO daily for management of symptoms.      3/13/24: OCD symptoms, mood well-controlled. @18wga, some fatigue and nausea with pregnancy. No need for fluoxetine dose change currently, will continue to monitor as pregnancy progresses.    *4/10/24: @21wga. Stable, euthymic, OCD symptoms well-controlled. No need for medication changes.     Suicide/Violence Risk Assessment:  Although patient has risk factor of anxiety, patient has multiple protective factors including child-related concerns, treatment adherence, employment, social supports, help-seeking behavior, no prior suicide attempts, no prior history of homicidal violence, no firearms access, absence of substance use disorder, absence of suicidal ideations, absence of homicidal ideations, and future orientation, making the patient's risk of harm to self or others acutely low and chronically low.      Impression:  OCD with peripartum exacerbation  Anxiety disorder, unspecified     PLAN:  - Continue Fluoxetine 60 mg PO daily for OCD and anxiety symptoms. Denies adverse effects. 90 day script with refills available at pt's outpatient pharmacy.   - Continue psychotherapy with Dr. Yoko Singh (exposure-response prevention therapy) as schedule allows  - Provided psychoeducation regarding psychiatric diagnoses, medications, and indicated treatments     FOLLOW-UP: 4 weeks, or sooner if new or  worsening symptoms  Risk Assessment:  Imminent Risk of Suicide or Serious Self-Injury: Low Risk -- Risk factors include: Severe anxiety Protective factors include:Denies current suicidal ideation, Denies history of suicide attempts , Future-oriented talk , Willingness to seek help and support , Skills in problem solving, conflict resolution, and nonviolent handling of disputes, Cultural and Zoroastrian beliefs that discourage suicide and support self-preservation , Access to a variety of clinical interventions , Receiving and engaged in care for mental, physical, and substance use disorders , History of adhering to treatment recommendations and/or prescribed medication regimen , Support through ongoing medical and mental healthcare relationships , Current/history of good response to treatment/meds , Interpersonal relationships and supports, e.g., family, friends, peers, community , and Restricted access to firearms or other lethal means of suicide   Imminent Risk of Violence or Homicide: Low Risk - Risk factors include: No significant risk factors identified on screening. Protective factors include: Lack of known history of harm to others , Lack of known history of violent ideation , Lack of known access to firearms , Sense of community, availability/access to resources and support , Sense of optimism, hope , Interpersonal competence , Affect regulation , Sense of self-efficacy, internal locus of control , and Positive, pro-social family/peer network   Treatment Plan:  There are no recently modified care plans to display for this patient.      Attestation Statements   Topics (in addition those noted above): N/A - E&M coding by complexity of care. , Diagnostic impressions, Importance of adherence to treatment , Instructions for treatment , Patient education , Risks and benefits of treatments , and Side effects of medications

## 2024-05-03 ENCOUNTER — ROUTINE PRENATAL (OUTPATIENT)
Dept: OBSTETRICS AND GYNECOLOGY | Facility: CLINIC | Age: 39
End: 2024-05-03
Payer: COMMERCIAL

## 2024-05-03 VITALS
BODY MASS INDEX: 26.63 KG/M2 | SYSTOLIC BLOOD PRESSURE: 112 MMHG | WEIGHT: 160 LBS | DIASTOLIC BLOOD PRESSURE: 73 MMHG | HEART RATE: 93 BPM

## 2024-05-03 DIAGNOSIS — F41.1 GENERALIZED ANXIETY DISORDER: ICD-10-CM

## 2024-05-03 DIAGNOSIS — O22.12: ICD-10-CM

## 2024-05-03 DIAGNOSIS — Z86.79 HISTORY OF POSTPARTUM HYPERTENSION: ICD-10-CM

## 2024-05-03 DIAGNOSIS — O44.02 PLACENTA PREVIA IN SECOND TRIMESTER (HHS-HCC): Primary | ICD-10-CM

## 2024-05-03 DIAGNOSIS — O09.522 MULTIGRAVIDA OF ADVANCED MATERNAL AGE IN SECOND TRIMESTER (HHS-HCC): ICD-10-CM

## 2024-05-03 DIAGNOSIS — Z3A.25 25 WEEKS GESTATION OF PREGNANCY (HHS-HCC): ICD-10-CM

## 2024-05-03 DIAGNOSIS — Z87.59 HISTORY OF POSTPARTUM HYPERTENSION: ICD-10-CM

## 2024-05-03 PROCEDURE — 0501F PRENATAL FLOW SHEET: CPT | Performed by: OBSTETRICS & GYNECOLOGY

## 2024-05-03 ASSESSMENT — ENCOUNTER SYMPTOMS
MUSCULOSKELETAL NEGATIVE: 0
PSYCHIATRIC NEGATIVE: 0
CARDIOVASCULAR NEGATIVE: 0
GASTROINTESTINAL NEGATIVE: 0
EYES NEGATIVE: 0
RESPIRATORY NEGATIVE: 0
CONSTITUTIONAL NEGATIVE: 0
ENDOCRINE NEGATIVE: 0
ALLERGIC/IMMUNOLOGIC NEGATIVE: 0
NEUROLOGICAL NEGATIVE: 0
HEMATOLOGIC/LYMPHATIC NEGATIVE: 0

## 2024-05-03 ASSESSMENT — PATIENT HEALTH QUESTIONNAIRE - PHQ9
1. LITTLE INTEREST OR PLEASURE IN DOING THINGS: NOT AT ALL
SUM OF ALL RESPONSES TO PHQ9 QUESTIONS 1 AND 2: 0
2. FEELING DOWN, DEPRESSED OR HOPELESS: NOT AT ALL

## 2024-05-03 ASSESSMENT — PAIN SCALES - GENERAL: PAINLEVEL_OUTOF10: 0 - NO PAIN

## 2024-05-03 ASSESSMENT — PAIN - FUNCTIONAL ASSESSMENT: PAIN_FUNCTIONAL_ASSESSMENT: 0-10

## 2024-05-08 PROBLEM — O22.12: Status: ACTIVE | Noted: 2024-05-08

## 2024-05-08 NOTE — PROGRESS NOTES
Concerned about varicosities  Perceiving increasing vaginal/perineal pressure  No vaginal bleeding    On exam:  Intensely purple anterior vaginal mucosa just proximal to hymenal ring  Unable to visualize cervix on speculum exam  On bimanual exam (with the help of Dr. Duong), no evidence of tissue protruding from external os of cervix.  Cervix reportedly effaced.      Unclear etiology of finding, but could be due to vaginal varicosity  Advised against vaginal penetrative intercourse, which was already recommended due to diagnosis of placenta previa.    FM/obstetric/VB precautions reviewed.      Will plan for 2nd trimester labs.

## 2024-05-16 ENCOUNTER — OFFICE VISIT (OUTPATIENT)
Dept: BEHAVIORAL HEALTH | Facility: CLINIC | Age: 39
End: 2024-05-16
Payer: COMMERCIAL

## 2024-05-16 DIAGNOSIS — F42.8 OBSESSIVE COMPULSIVE NEUROSIS: ICD-10-CM

## 2024-05-16 DIAGNOSIS — F41.1 GENERALIZED ANXIETY DISORDER: ICD-10-CM

## 2024-05-16 PROCEDURE — 99214 OFFICE O/P EST MOD 30 MIN: CPT | Performed by: STUDENT IN AN ORGANIZED HEALTH CARE EDUCATION/TRAINING PROGRAM

## 2024-05-16 PROCEDURE — 99214 OFFICE O/P EST MOD 30 MIN: CPT | Mod: GC,GT,AM,95 | Performed by: STUDENT IN AN ORGANIZED HEALTH CARE EDUCATION/TRAINING PROGRAM

## 2024-05-16 NOTE — PROGRESS NOTES
Subjective    All Individuals Present: Patient and Provider (Encounter Provider)     ID: Norah Khan is a 38 y.o. female with history of OCD and anxiety.     Interval History/HPI/PFSH:  @27wga (KIMBERLY 8/2024)    Reports pregnancy is going well. Getting over a cold since last week. Complained some physical symptoms including varicose vein. New job going well, everyone welcoming. Sleeping well, napping occasionally, a little more fatigued since the cold. Tolerating med well and does not believe dose need to be adjusted. Denies any depression or OCD symptoms. Trying to stay active with daughter. Seeing Dr. Singh next month.     Less fatigue overall, still napping. Less nausea.    Daughter developing well.     Medication side effects: None Reported     Review of Systems  Constitutional: Negative for anorexia, chills, fatigue, and fevers  Psychiatric: Negative  Neurological: Negative   Other: N/A    Objective   LMP 11/09/2023 (Exact Date)   Wt Readings from Last 4 Encounters:   05/03/24 72.6 kg (160 lb)   04/05/24 74.6 kg (164 lb 6.4 oz)   03/08/24 71.7 kg (158 lb)   02/07/24 70.3 kg (155 lb)       Mental Status Exam  General Appearance: Well groomed, appropriate eye contact.  Attitude/Behavior: Cooperative, conversant, engaged, and with good eye contact.  Motor: No psychomotor agitation or retardation, no tremor or other abnormal movements.  Speech: Normal rate, volume, prosody  Gait/Station:  not assessed  Mood: good.  Affect: Euthymic, full-range  Thought Process: Linear, goal directed  Thought Associations: No loosening of associations  Thought Content: normal  Sensorium: Alert and oriented to person, place, time and situation  Insight: Intact, as evidenced by knowledge of illness  Judgment: Intact  Cognition: Cognitively intact to conversational testing with respect to attention, orientation, fund of knowledge, recent and remote memory, and language.  Testing: N/A.    Laboratory/Imaging/Diagnostic Tests        Assessment/Plan   Overall Formulation and Differential Diagnosis:  Norah Khan is a 38 y.o. female, 27 gwa (KIMBERLY 08/15/2024) who meets criteria for OCD presents for follow up    Interval Assessment:  past psychiatric history of OCD and anxiety with postpartum exacerbation, now at 12 months postpartum (delivery date 5/6/2022) who [facility] for psychiatric follow-up. She is prescribed Fluoxetine 60mg PO daily for management of symptoms.      05/16/2024: Reporting ongoing stability with OCD symptoms. No current issues with mood, sleep or appetite. Pregnancy progressing nicely, and doing well with new job. She is medication complaint and tolerating well without adverse effects. Will continue fluoxetine at current dosage given ongoing stability. Denies SI/HI/AVH or additional safety concerns.      Suicide/Violence Risk Assessment:  Although patient has risk factor of anxiety, patient has multiple protective factors including child-related concerns, treatment adherence, employment, social supports, help-seeking behavior, no prior suicide attempts, no prior history of homicidal violence, no firearms access, absence of substance use disorder, absence of suicidal ideations, absence of homicidal ideations, and future orientation, making the patient's risk of harm to self or others acutely low and chronically low.      Impression:  OCD with postpartum exacerbation  Anxiety disorder, unspecified     PLAN:  - Continue Fluoxetine 60 mg PO daily for OCD and anxiety symptoms. Denies adverse effects. 90 day script with refills available at pt's outpatient pharmacy.   - Continue psychotherapy with Dr. Yoko Singh (exposure-response prevention therapy) as schedule allows  - Provided psychoeducation regarding psychiatric diagnoses, medications, and indicated treatments     FOLLOW-UP: 2 months, or sooner if new or worsening symptoms or gets pregnant.     Risk Assessment:  Imminent Risk of  Suicide or Serious Self-Injury: Low Risk -- Risk factors include: No significant risk factors identified on screening Protective factors include:Denies current suicidal ideation, Denies history of suicide attempts , Future-oriented talk , Willingness to seek help and support , Gender, Age, and pregnancy  Imminent Risk of Violence or Homicide: Low Risk - Risk factors include: No significant risk factors identified on screening. Protective factors include: Lack of known history of harm to others   Treatment Plan:  There are no recently modified care plans to display for this patient.      Attestation Statements   Number of Minutes Spent Performing Evaluation & Management (E&M): 30 mins

## 2024-05-22 ENCOUNTER — LAB (OUTPATIENT)
Dept: LAB | Facility: LAB | Age: 39
End: 2024-05-22
Payer: COMMERCIAL

## 2024-05-22 DIAGNOSIS — Z3A.25 25 WEEKS GESTATION OF PREGNANCY (HHS-HCC): ICD-10-CM

## 2024-05-22 LAB
ERYTHROCYTE [DISTWIDTH] IN BLOOD BY AUTOMATED COUNT: 13.2 % (ref 11.5–14.5)
GLUCOSE 1H P 50 G GLC PO SERPL-MCNC: 93 MG/DL
HCT VFR BLD AUTO: 35.8 % (ref 36–46)
HGB BLD-MCNC: 11.4 G/DL (ref 12–16)
MCH RBC QN AUTO: 29.2 PG (ref 26–34)
MCHC RBC AUTO-ENTMCNC: 31.8 G/DL (ref 32–36)
MCV RBC AUTO: 92 FL (ref 80–100)
NRBC BLD-RTO: 0 /100 WBCS (ref 0–0)
PLATELET # BLD AUTO: 281 X10*3/UL (ref 150–450)
RBC # BLD AUTO: 3.91 X10*6/UL (ref 4–5.2)
REFLEX ADDED, ANEMIA PANEL: NORMAL
TREPONEMA PALLIDUM IGG+IGM AB [PRESENCE] IN SERUM OR PLASMA BY IMMUNOASSAY: NONREACTIVE
WBC # BLD AUTO: 8.8 X10*3/UL (ref 4.4–11.3)

## 2024-05-22 PROCEDURE — 82947 ASSAY GLUCOSE BLOOD QUANT: CPT

## 2024-05-22 PROCEDURE — 85027 COMPLETE CBC AUTOMATED: CPT

## 2024-05-22 PROCEDURE — 86780 TREPONEMA PALLIDUM: CPT

## 2024-05-22 PROCEDURE — 36415 COLL VENOUS BLD VENIPUNCTURE: CPT

## 2024-05-29 ENCOUNTER — ROUTINE PRENATAL (OUTPATIENT)
Dept: OBSTETRICS AND GYNECOLOGY | Facility: CLINIC | Age: 39
End: 2024-05-29
Payer: COMMERCIAL

## 2024-05-29 VITALS — WEIGHT: 172 LBS | SYSTOLIC BLOOD PRESSURE: 110 MMHG | DIASTOLIC BLOOD PRESSURE: 71 MMHG | BODY MASS INDEX: 28.62 KG/M2

## 2024-05-29 DIAGNOSIS — Z23 NEED FOR DIPHTHERIA-TETANUS-PERTUSSIS (TDAP) VACCINE: Primary | ICD-10-CM

## 2024-05-29 DIAGNOSIS — O44.03 PLACENTA PREVIA IN THIRD TRIMESTER (HHS-HCC): ICD-10-CM

## 2024-05-29 DIAGNOSIS — Z87.59 HISTORY OF POSTPARTUM HYPERTENSION: ICD-10-CM

## 2024-05-29 DIAGNOSIS — Z86.79 HISTORY OF POSTPARTUM HYPERTENSION: ICD-10-CM

## 2024-05-29 DIAGNOSIS — Z71.85 VACCINE COUNSELING: ICD-10-CM

## 2024-05-29 DIAGNOSIS — O09.523 MULTIGRAVIDA OF ADVANCED MATERNAL AGE IN THIRD TRIMESTER (HHS-HCC): ICD-10-CM

## 2024-05-29 DIAGNOSIS — F41.1 GENERALIZED ANXIETY DISORDER: ICD-10-CM

## 2024-05-29 DIAGNOSIS — Z3A.28 28 WEEKS GESTATION OF PREGNANCY (HHS-HCC): ICD-10-CM

## 2024-05-29 DIAGNOSIS — O22.03: ICD-10-CM

## 2024-05-29 PROCEDURE — 90715 TDAP VACCINE 7 YRS/> IM: CPT | Performed by: OBSTETRICS & GYNECOLOGY

## 2024-05-29 PROCEDURE — 0501F PRENATAL FLOW SHEET: CPT | Performed by: OBSTETRICS & GYNECOLOGY

## 2024-05-29 NOTE — PROGRESS NOTES
Prolonged cough  Previously with yellow sputum  Feels like has something in throat  Reviewed 24-28 wks labs  Continues to have pelvic pressure  No bleeding  Has upcoming ultrasound to assess placental location.      Problem List Items Addressed This Visit          Pregnancy    Generalized anxiety disorder    History of postpartum hypertension    Overview     Takes bASA 162 mg         Multigravida of advanced maternal age in third trimester ([facility])    Obstetric varicose veins, third trimester ([facility])    Placenta previa in third trimester ([facility])    Overview     Diagnosed @ 19+ wks 3/21/24 (anatomy scan)  Anterior placenta  No history of prior uterine surgery  Had spotting prior to diagnosis of placenta previa at time of anatomy scan          Other Visit Diagnoses       Need for diphtheria-tetanus-pertussis (Tdap) vaccine    -  Primary    Relevant Orders    Tdap vaccine, age 7 years and older  (BOOSTRIX) (Completed)    Vaccine counseling

## 2024-06-01 PROBLEM — O22.03: Status: ACTIVE | Noted: 2024-05-08

## 2024-06-01 PROBLEM — O09.523 MULTIGRAVIDA OF ADVANCED MATERNAL AGE IN THIRD TRIMESTER (HHS-HCC): Status: ACTIVE | Noted: 2024-03-08

## 2024-06-01 PROBLEM — O44.03 PLACENTA PREVIA IN THIRD TRIMESTER (HHS-HCC): Status: ACTIVE | Noted: 2024-04-02

## 2024-06-07 ENCOUNTER — HOSPITAL ENCOUNTER (OUTPATIENT)
Dept: RADIOLOGY | Facility: CLINIC | Age: 39
Discharge: HOME | End: 2024-06-07
Payer: COMMERCIAL

## 2024-06-07 DIAGNOSIS — Z3A.09 9 WEEKS GESTATION OF PREGNANCY (HHS-HCC): ICD-10-CM

## 2024-06-07 DIAGNOSIS — O09.521 MULTIGRAVIDA OF ADVANCED MATERNAL AGE IN FIRST TRIMESTER (HHS-HCC): ICD-10-CM

## 2024-06-07 PROCEDURE — 76819 FETAL BIOPHYS PROFIL W/O NST: CPT

## 2024-06-07 PROCEDURE — 76819 FETAL BIOPHYS PROFIL W/O NST: CPT | Performed by: STUDENT IN AN ORGANIZED HEALTH CARE EDUCATION/TRAINING PROGRAM

## 2024-06-07 PROCEDURE — 76816 OB US FOLLOW-UP PER FETUS: CPT

## 2024-06-07 PROCEDURE — 76817 TRANSVAGINAL US OBSTETRIC: CPT

## 2024-06-07 PROCEDURE — 76817 TRANSVAGINAL US OBSTETRIC: CPT | Performed by: STUDENT IN AN ORGANIZED HEALTH CARE EDUCATION/TRAINING PROGRAM

## 2024-06-07 PROCEDURE — 76816 OB US FOLLOW-UP PER FETUS: CPT | Performed by: STUDENT IN AN ORGANIZED HEALTH CARE EDUCATION/TRAINING PROGRAM

## 2024-06-10 ENCOUNTER — ROUTINE PRENATAL (OUTPATIENT)
Dept: OBSTETRICS AND GYNECOLOGY | Facility: CLINIC | Age: 39
End: 2024-06-10
Payer: COMMERCIAL

## 2024-06-10 VITALS — BODY MASS INDEX: 28.94 KG/M2 | SYSTOLIC BLOOD PRESSURE: 122 MMHG | DIASTOLIC BLOOD PRESSURE: 80 MMHG | WEIGHT: 173.9 LBS

## 2024-06-10 DIAGNOSIS — Z71.85 VACCINE COUNSELING: ICD-10-CM

## 2024-06-10 DIAGNOSIS — F41.1 GENERALIZED ANXIETY DISORDER: ICD-10-CM

## 2024-06-10 DIAGNOSIS — O09.523 MULTIGRAVIDA OF ADVANCED MATERNAL AGE IN THIRD TRIMESTER (HHS-HCC): ICD-10-CM

## 2024-06-10 DIAGNOSIS — O22.03: ICD-10-CM

## 2024-06-10 DIAGNOSIS — Z23 NEED FOR DIPHTHERIA-TETANUS-PERTUSSIS (TDAP) VACCINE: ICD-10-CM

## 2024-06-10 DIAGNOSIS — Z87.59 HISTORY OF POSTPARTUM HYPERTENSION: ICD-10-CM

## 2024-06-10 DIAGNOSIS — Z3A.30 30 WEEKS GESTATION OF PREGNANCY (HHS-HCC): ICD-10-CM

## 2024-06-10 DIAGNOSIS — O44.03 PLACENTA PREVIA IN THIRD TRIMESTER (HHS-HCC): Primary | ICD-10-CM

## 2024-06-10 DIAGNOSIS — Z86.79 HISTORY OF POSTPARTUM HYPERTENSION: ICD-10-CM

## 2024-06-10 PROCEDURE — 90471 IMMUNIZATION ADMIN: CPT | Performed by: OBSTETRICS & GYNECOLOGY

## 2024-06-10 PROCEDURE — 0501F PRENATAL FLOW SHEET: CPT | Performed by: OBSTETRICS & GYNECOLOGY

## 2024-06-10 NOTE — PROGRESS NOTES
"Still with low-lying placenta, but no longer previa  Considering pills for contraception - had IUD previously, but didn't like idea of an \"implant\"  Reviewed results of ultrasound, which demonstrated resolved placenta previa, but now low-lying (2 mm away from os).  Has repeat scan      Problem List Items Addressed This Visit          Pregnancy    Generalized anxiety disorder    History of postpartum hypertension    Overview     Takes bASA 162 mg         Multigravida of advanced maternal age in third trimester ([facility])    Obstetric varicose veins, third trimester ([facility])    Placenta previa in third trimester ([facility])    Overview     Diagnosed @ 19+ wks 3/21/24 (anatomy scan)  Anterior placenta  No history of prior uterine surgery  Had spotting prior to diagnosis of placenta previa at time of anatomy scan  Resolved @ 29 wks scan, low lying (2 mm away)          Other Visit Diagnoses       Need for diphtheria-tetanus-pertussis (Tdap) vaccine    -  Primary    Relevant Orders    Tdap vaccine, age 7 years and older  (BOOSTRIX) (Completed)    Vaccine counseling        Relevant Orders    Tdap vaccine, age 7 years and older  (BOOSTRIX) (Completed)            "

## 2024-06-14 ENCOUNTER — APPOINTMENT (OUTPATIENT)
Dept: BEHAVIORAL HEALTH | Facility: CLINIC | Age: 39
End: 2024-06-14
Payer: COMMERCIAL

## 2024-06-14 DIAGNOSIS — F41.1 GENERALIZED ANXIETY DISORDER: Primary | ICD-10-CM

## 2024-06-14 PROCEDURE — 90837 PSYTX W PT 60 MINUTES: CPT | Performed by: PSYCHOLOGIST

## 2024-06-14 NOTE — PROGRESS NOTES
START TIME: 1:05   END TIME: 2  TOTAL TIME FACE TO FACE: 55mins    Subjective   Patient ID: Winston Khan is a 38 y.o. female who presents for   Problem List Items Addressed This Visit       Generalized anxiety disorder - Primary   .    Virtual or Telephone Consent    An interactive audio and video telecommunication system which permits real time communications between the patient (at the originating site) and provider (at the distant site) was utilized to provide this telehealth service.   Verbal consent was requested and obtained from Winston Khan on this date, 06/14/24 for a telehealth visit.      CONTENT OF SESSION:  This was a followup appointment (session 12) between provider and patient to address symptoms of postpartum anxiety and OCD. winston is still seeing Dr. Walsh for medication management with positive effect.     Focus of session was on Winston's adjustment to current pregnancy (around 31 wga). Winston noted some anxiety in context of anticipating postpartum adjustment, as well as an interpersonal stressor. Provider gave psychoeducation on common psychological reactions in postpartum adjustment and signs of anxiety or depression. Time was spent on role play of communication. Provider used Socratic questioning to facilitate cognitive restructuring of potential unrealistic or unhelpful cognitions and patient was able to engage in cognitive restructuring. Overall, Winston noted she is feeling well. Provider gave supportive counseling and normalized common emotional experiences.    Objective   Social History     Substance and Sexual Activity   Alcohol Use Yes    Alcohol/week: 3.0 standard drinks of alcohol    Types: 3 Standard drinks or equivalent per week      Social History     Social History Narrative    Not on file        Assessment/Plan   Problem List Items Addressed This Visit       Generalized anxiety disorder - Primary     PLAN: Plan made for individual CBT to address OCD on  monthly basis. She will continue with Dr. Travis Walsh for medication management.

## 2024-06-15 ENCOUNTER — TELEPHONE (OUTPATIENT)
Dept: OBSTETRICS AND GYNECOLOGY | Facility: CLINIC | Age: 39
End: 2024-06-15
Payer: COMMERCIAL

## 2024-06-16 ENCOUNTER — HOSPITAL ENCOUNTER (INPATIENT)
Facility: HOSPITAL | Age: 39
DRG: 833 | End: 2024-06-16
Attending: OBSTETRICS & GYNECOLOGY | Admitting: STUDENT IN AN ORGANIZED HEALTH CARE EDUCATION/TRAINING PROGRAM
Payer: COMMERCIAL

## 2024-06-16 VITALS
DIASTOLIC BLOOD PRESSURE: 73 MMHG | RESPIRATION RATE: 18 BRPM | HEART RATE: 94 BPM | SYSTOLIC BLOOD PRESSURE: 114 MMHG | OXYGEN SATURATION: 98 % | TEMPERATURE: 97.7 F

## 2024-06-16 PROBLEM — O46.90 VAGINAL BLEEDING IN PREGNANCY (HHS-HCC): Status: ACTIVE | Noted: 2024-06-16

## 2024-06-16 LAB
ABO GROUP (TYPE) IN BLOOD: NORMAL
ANTIBODY SCREEN: NORMAL
APTT PPP: 24 SECONDS (ref 27–38)
BILIRUBIN, POC: NEGATIVE
BLOOD EXPIRATION DATE: NORMAL
BLOOD EXPIRATION DATE: NORMAL
BLOOD URINE, POC: POSITIVE
CLARITY, POC: CLEAR
COLOR, POC: NORMAL
DISPENSE STATUS: NORMAL
DISPENSE STATUS: NORMAL
ERYTHROCYTE [DISTWIDTH] IN BLOOD BY AUTOMATED COUNT: 13.6 % (ref 11.5–14.5)
FIBRINOGEN PPP-MCNC: 639 MG/DL (ref 200–400)
GLUCOSE URINE, POC: NEGATIVE
GP B STREP GENITAL QL CULT: NORMAL
HCT VFR BLD AUTO: 33.9 % (ref 36–46)
HGB BLD-MCNC: 11.6 G/DL (ref 12–16)
HOLD SPECIMEN: NORMAL
INR PPP: 1 (ref 0.9–1.1)
KETONES, POC: NEGATIVE
LEUKOCYTE EST, POC: NEGATIVE
MCH RBC QN AUTO: 30.4 PG (ref 26–34)
MCHC RBC AUTO-ENTMCNC: 34.2 G/DL (ref 32–36)
MCV RBC AUTO: 89 FL (ref 80–100)
NITRITE, POC: NEGATIVE
NRBC BLD-RTO: 0 /100 WBCS (ref 0–0)
PH, POC: 6
PLATELET # BLD AUTO: 201 X10*3/UL (ref 150–450)
POC APPEARANCE OF BODY FLUID: NORMAL
PRODUCT BLOOD TYPE: 6200
PRODUCT BLOOD TYPE: 6200
PRODUCT CODE: NORMAL
PRODUCT CODE: NORMAL
PROTHROMBIN TIME: 11.5 SECONDS (ref 9.8–12.8)
RBC # BLD AUTO: 3.81 X10*6/UL (ref 4–5.2)
RH FACTOR (ANTIGEN D): NORMAL
SPECIFIC GRAVITY, POC: 1.01
UNIT ABO: NORMAL
UNIT ABO: NORMAL
UNIT NUMBER: NORMAL
UNIT NUMBER: NORMAL
UNIT RH: NORMAL
UNIT RH: NORMAL
UNIT VOLUME: 350
UNIT VOLUME: 350
URINE PROTEIN, POC: NORMAL
UROBILINOGEN, POC: 0.2
WBC # BLD AUTO: 10.3 X10*3/UL (ref 4.4–11.3)
XM INTEP: NORMAL
XM INTEP: NORMAL

## 2024-06-16 PROCEDURE — 2500000001 HC RX 250 WO HCPCS SELF ADMINISTERED DRUGS (ALT 637 FOR MEDICARE OP)

## 2024-06-16 PROCEDURE — 36415 COLL VENOUS BLD VENIPUNCTURE: CPT

## 2024-06-16 PROCEDURE — 87081 CULTURE SCREEN ONLY: CPT

## 2024-06-16 PROCEDURE — 1220000001 HC OB SEMI-PRIVATE ROOM DAILY

## 2024-06-16 PROCEDURE — 85027 COMPLETE CBC AUTOMATED: CPT

## 2024-06-16 PROCEDURE — 85384 FIBRINOGEN ACTIVITY: CPT

## 2024-06-16 PROCEDURE — 99199 UNLISTED SPECIAL SVC PX/RPRT: CPT

## 2024-06-16 PROCEDURE — 86923 COMPATIBILITY TEST ELECTRIC: CPT

## 2024-06-16 PROCEDURE — 86901 BLOOD TYPING SEROLOGIC RH(D): CPT

## 2024-06-16 PROCEDURE — 2500000004 HC RX 250 GENERAL PHARMACY W/ HCPCS (ALT 636 FOR OP/ED)

## 2024-06-16 PROCEDURE — 85610 PROTHROMBIN TIME: CPT

## 2024-06-16 RX ORDER — FLUOXETINE HYDROCHLORIDE 20 MG/1
60 CAPSULE ORAL NIGHTLY
Status: DISCONTINUED | OUTPATIENT
Start: 2024-06-16 | End: 2024-06-18 | Stop reason: HOSPADM

## 2024-06-16 RX ORDER — HYDRALAZINE HYDROCHLORIDE 20 MG/ML
5 INJECTION INTRAMUSCULAR; INTRAVENOUS ONCE AS NEEDED
Status: DISCONTINUED | OUTPATIENT
Start: 2024-06-16 | End: 2024-06-18 | Stop reason: HOSPADM

## 2024-06-16 RX ORDER — SIMETHICONE 80 MG
80 TABLET,CHEWABLE ORAL 4 TIMES DAILY PRN
Status: DISCONTINUED | OUTPATIENT
Start: 2024-06-16 | End: 2024-06-18 | Stop reason: HOSPADM

## 2024-06-16 RX ORDER — ONDANSETRON HYDROCHLORIDE 2 MG/ML
4 INJECTION, SOLUTION INTRAVENOUS EVERY 6 HOURS PRN
Status: DISCONTINUED | OUTPATIENT
Start: 2024-06-16 | End: 2024-06-18 | Stop reason: HOSPADM

## 2024-06-16 RX ORDER — NIFEDIPINE 10 MG/1
10 CAPSULE ORAL ONCE AS NEEDED
Status: DISCONTINUED | OUTPATIENT
Start: 2024-06-16 | End: 2024-06-18 | Stop reason: HOSPADM

## 2024-06-16 RX ORDER — ONDANSETRON 4 MG/1
4 TABLET, FILM COATED ORAL EVERY 6 HOURS PRN
Status: DISCONTINUED | OUTPATIENT
Start: 2024-06-16 | End: 2024-06-18 | Stop reason: HOSPADM

## 2024-06-16 RX ORDER — SODIUM CHLORIDE, SODIUM LACTATE, POTASSIUM CHLORIDE, CALCIUM CHLORIDE 600; 310; 30; 20 MG/100ML; MG/100ML; MG/100ML; MG/100ML
75 INJECTION, SOLUTION INTRAVENOUS CONTINUOUS
Status: DISCONTINUED | OUTPATIENT
Start: 2024-06-16 | End: 2024-06-18 | Stop reason: HOSPADM

## 2024-06-16 RX ORDER — NIFEDIPINE 10 MG/1
10 CAPSULE ORAL ONCE AS NEEDED
Status: DISCONTINUED | OUTPATIENT
Start: 2024-06-16 | End: 2024-06-16

## 2024-06-16 RX ORDER — FLUOXETINE HYDROCHLORIDE 60 MG/1
60 TABLET, FILM COATED ORAL; ORAL DAILY
Status: CANCELLED | OUTPATIENT
Start: 2024-06-16

## 2024-06-16 RX ORDER — LIDOCAINE HYDROCHLORIDE 10 MG/ML
0.5 INJECTION INFILTRATION; PERINEURAL ONCE AS NEEDED
Status: DISCONTINUED | OUTPATIENT
Start: 2024-06-16 | End: 2024-06-18 | Stop reason: HOSPADM

## 2024-06-16 RX ORDER — BISACODYL 10 MG/1
10 SUPPOSITORY RECTAL DAILY PRN
Status: DISCONTINUED | OUTPATIENT
Start: 2024-06-16 | End: 2024-06-18 | Stop reason: HOSPADM

## 2024-06-16 RX ORDER — METOCLOPRAMIDE 10 MG/1
10 TABLET ORAL EVERY 6 HOURS PRN
Status: DISCONTINUED | OUTPATIENT
Start: 2024-06-16 | End: 2024-06-18 | Stop reason: HOSPADM

## 2024-06-16 RX ORDER — NAPROXEN SODIUM 220 MG/1
162 TABLET, FILM COATED ORAL NIGHTLY
Status: DISCONTINUED | OUTPATIENT
Start: 2024-06-16 | End: 2024-06-18 | Stop reason: HOSPADM

## 2024-06-16 RX ORDER — HYDRALAZINE HYDROCHLORIDE 20 MG/ML
5 INJECTION INTRAMUSCULAR; INTRAVENOUS ONCE AS NEEDED
Status: DISCONTINUED | OUTPATIENT
Start: 2024-06-16 | End: 2024-06-16

## 2024-06-16 RX ORDER — ADHESIVE BANDAGE
10 BANDAGE TOPICAL
Status: DISCONTINUED | OUTPATIENT
Start: 2024-06-16 | End: 2024-06-18 | Stop reason: HOSPADM

## 2024-06-16 RX ORDER — BETAMETHASONE SODIUM PHOSPHATE AND BETAMETHASONE ACETATE 3; 3 MG/ML; MG/ML
12 INJECTION, SUSPENSION INTRA-ARTICULAR; INTRALESIONAL; INTRAMUSCULAR; SOFT TISSUE EVERY 24 HOURS
Status: COMPLETED | OUTPATIENT
Start: 2024-06-16 | End: 2024-06-17

## 2024-06-16 RX ORDER — CALCIUM GLUCONATE 98 MG/ML
1 INJECTION, SOLUTION INTRAVENOUS ONCE AS NEEDED
Status: DISCONTINUED | OUTPATIENT
Start: 2024-06-16 | End: 2024-06-17 | Stop reason: ALTCHOICE

## 2024-06-16 RX ORDER — METOCLOPRAMIDE HYDROCHLORIDE 5 MG/ML
10 INJECTION INTRAMUSCULAR; INTRAVENOUS EVERY 6 HOURS PRN
Status: DISCONTINUED | OUTPATIENT
Start: 2024-06-16 | End: 2024-06-18 | Stop reason: HOSPADM

## 2024-06-16 RX ORDER — LIDOCAINE HYDROCHLORIDE 10 MG/ML
0.5 INJECTION INFILTRATION; PERINEURAL ONCE AS NEEDED
Status: DISCONTINUED | OUTPATIENT
Start: 2024-06-16 | End: 2024-06-16

## 2024-06-16 RX ORDER — POLYETHYLENE GLYCOL 3350 17 G/17G
17 POWDER, FOR SOLUTION ORAL 2 TIMES DAILY PRN
Status: DISCONTINUED | OUTPATIENT
Start: 2024-06-16 | End: 2024-06-18 | Stop reason: HOSPADM

## 2024-06-16 RX ORDER — ONDANSETRON 4 MG/1
4 TABLET, FILM COATED ORAL EVERY 6 HOURS PRN
Status: DISCONTINUED | OUTPATIENT
Start: 2024-06-16 | End: 2024-06-16

## 2024-06-16 RX ORDER — MAGNESIUM SULFATE HEPTAHYDRATE 40 MG/ML
2 INJECTION, SOLUTION INTRAVENOUS CONTINUOUS
Status: DISCONTINUED | OUTPATIENT
Start: 2024-06-16 | End: 2024-06-17 | Stop reason: ALTCHOICE

## 2024-06-16 RX ORDER — ONDANSETRON HYDROCHLORIDE 2 MG/ML
4 INJECTION, SOLUTION INTRAVENOUS EVERY 6 HOURS PRN
Status: DISCONTINUED | OUTPATIENT
Start: 2024-06-16 | End: 2024-06-16

## 2024-06-16 RX ORDER — LABETALOL HYDROCHLORIDE 5 MG/ML
20 INJECTION, SOLUTION INTRAVENOUS ONCE AS NEEDED
Status: DISCONTINUED | OUTPATIENT
Start: 2024-06-16 | End: 2024-06-18 | Stop reason: HOSPADM

## 2024-06-16 RX ORDER — LABETALOL HYDROCHLORIDE 5 MG/ML
20 INJECTION, SOLUTION INTRAVENOUS ONCE AS NEEDED
Status: DISCONTINUED | OUTPATIENT
Start: 2024-06-16 | End: 2024-06-16

## 2024-06-16 RX ADMIN — ASPIRIN 81 MG 162 MG: 81 TABLET ORAL at 20:34

## 2024-06-16 RX ADMIN — BETAMETHASONE ACETATE AND BETAMETHASONE SODIUM PHOSPHATE 12 MG: 3; 3 INJECTION, SUSPENSION INTRA-ARTICULAR; INTRALESIONAL; INTRAMUSCULAR; SOFT TISSUE at 01:46

## 2024-06-16 RX ADMIN — MAGNESIUM SULFATE HEPTAHYDRATE 2 G/HR: 40 INJECTION, SOLUTION INTRAVENOUS at 08:50

## 2024-06-16 RX ADMIN — FLUOXETINE 60 MG: 20 CAPSULE ORAL at 20:33

## 2024-06-16 RX ADMIN — PRENATAL VIT W/ FE FUMARATE-FA TAB 27-0.8 MG 1 TABLET: 27-0.8 TAB at 20:34

## 2024-06-16 RX ADMIN — ONDANSETRON 4 MG: 2 INJECTION INTRAMUSCULAR; INTRAVENOUS at 02:05

## 2024-06-16 SDOH — SOCIAL STABILITY: SOCIAL INSECURITY: HAS ANYONE EVER THREATENED TO HURT YOUR FAMILY OR YOUR PETS?: NO

## 2024-06-16 SDOH — SOCIAL STABILITY: SOCIAL INSECURITY: HAVE YOU HAD ANY THOUGHTS OF HARMING ANYONE ELSE?: NO

## 2024-06-16 SDOH — SOCIAL STABILITY: SOCIAL INSECURITY: DOES ANYONE TRY TO KEEP YOU FROM HAVING/CONTACTING OTHER FRIENDS OR DOING THINGS OUTSIDE YOUR HOME?: NO

## 2024-06-16 SDOH — ECONOMIC STABILITY: HOUSING INSECURITY: DO YOU FEEL UNSAFE GOING BACK TO THE PLACE WHERE YOU ARE LIVING?: NO

## 2024-06-16 SDOH — SOCIAL STABILITY: SOCIAL INSECURITY: DO YOU FEEL ANYONE HAS EXPLOITED OR TAKEN ADVANTAGE OF YOU FINANCIALLY OR OF YOUR PERSONAL PROPERTY?: NO

## 2024-06-16 SDOH — SOCIAL STABILITY: SOCIAL INSECURITY: ARE THERE ANY APPARENT SIGNS OF INJURIES/BEHAVIORS THAT COULD BE RELATED TO ABUSE/NEGLECT?: NO

## 2024-06-16 SDOH — SOCIAL STABILITY: SOCIAL INSECURITY: ABUSE SCREEN: ADULT

## 2024-06-16 SDOH — SOCIAL STABILITY: SOCIAL INSECURITY: ARE YOU OR HAVE YOU BEEN THREATENED OR ABUSED PHYSICALLY, EMOTIONALLY, OR SEXUALLY BY ANYONE?: NO

## 2024-06-16 SDOH — HEALTH STABILITY: MENTAL HEALTH: WERE YOU ABLE TO COMPLETE ALL THE BEHAVIORAL HEALTH SCREENINGS?: YES

## 2024-06-16 SDOH — HEALTH STABILITY: MENTAL HEALTH: HAVE YOU USED ANY SUBSTANCES (CANABIS, COCAINE, HEROIN, HALLUCINOGENS, INHALANTS, ETC.) IN THE PAST 12 MONTHS?: NO

## 2024-06-16 SDOH — HEALTH STABILITY: MENTAL HEALTH: SUICIDAL BEHAVIOR (LIFETIME): NO

## 2024-06-16 SDOH — HEALTH STABILITY: MENTAL HEALTH: HAVE YOU USED ANY PRESCRIPTION DRUGS OTHER THAN PRESCRIBED IN THE PAST 12 MONTHS?: NO

## 2024-06-16 SDOH — SOCIAL STABILITY: SOCIAL INSECURITY: PHYSICAL ABUSE: DENIES

## 2024-06-16 SDOH — SOCIAL STABILITY: SOCIAL INSECURITY: HAVE YOU HAD THOUGHTS OF HARMING ANYONE ELSE?: NO

## 2024-06-16 SDOH — HEALTH STABILITY: MENTAL HEALTH: WISH TO BE DEAD (PAST 1 MONTH): NO

## 2024-06-16 SDOH — HEALTH STABILITY: MENTAL HEALTH: NON-SPECIFIC ACTIVE SUICIDAL THOUGHTS (PAST 1 MONTH): NO

## 2024-06-16 SDOH — SOCIAL STABILITY: SOCIAL INSECURITY: VERBAL ABUSE: DENIES

## 2024-06-16 ASSESSMENT — PAIN SCALES - GENERAL
PAINLEVEL_OUTOF10: 0 - NO PAIN
PAINLEVEL: 0 - NO PAIN
PAINLEVEL_OUTOF10: 0 - NO PAIN

## 2024-06-16 ASSESSMENT — LIFESTYLE VARIABLES
SKIP TO QUESTIONS 9-10: 1
AUDIT-C TOTAL SCORE: 0
AUDIT-C TOTAL SCORE: 0
HOW MANY STANDARD DRINKS CONTAINING ALCOHOL DO YOU HAVE ON A TYPICAL DAY: PATIENT DOES NOT DRINK
HOW OFTEN DO YOU HAVE 6 OR MORE DRINKS ON ONE OCCASION: NEVER
HOW OFTEN DO YOU HAVE A DRINK CONTAINING ALCOHOL: NEVER

## 2024-06-16 ASSESSMENT — PATIENT HEALTH QUESTIONNAIRE - PHQ9
1. LITTLE INTEREST OR PLEASURE IN DOING THINGS: NOT AT ALL
SUM OF ALL RESPONSES TO PHQ9 QUESTIONS 1 & 2: 0
2. FEELING DOWN, DEPRESSED OR HOPELESS: NOT AT ALL

## 2024-06-16 ASSESSMENT — ACTIVITIES OF DAILY LIVING (ADL): LACK_OF_TRANSPORTATION: NO

## 2024-06-16 NOTE — SIGNIFICANT EVENT
Antepartum Progress Note    Subjective   Patient states she had a small amount of brown spotting on her pad in the past few hours but has not had any active bleeding.    Objective   Allergies:   Suprax [cefixime]    Last Vitals:  Temp Pulse Resp BP MAP Pulse Ox   36.2 °C (97.2 °F) 91 16 107/54   97 %     Vitals Min/Max Last 24 Hours:  Temp  Min: 36.1 °C (97 °F)  Max: 36.8 °C (98.2 °F)  Pulse  Min: 82  Max: 101  Resp  Min: 16  Max: 18  BP  Min: 107/54  Max: 126/61    Intake/Output:     Intake/Output Summary (Last 24 hours) at 2024 0948  Last data filed at 2024 0852  Gross per 24 hour   Intake 964.9 ml   Output 600 ml   Net 364.9 ml       Physical Exam:  GENERAL: Examination reveals a well developed, well nourished, gravid female in no acute distress. She is alert and cooperative.  HEENT: External ears normal. Nose normal, no erythema or discharge. Mouth and throat clear.  NECK: supple, no significant adenopathy  LUNGS: Normal respiratory effort  HEART: RRR  ABDOMEN: Gravid  EXTREMITIES: no limitation in range of motion  SKIN: normal coloration and turgor, no rashes  NEUROLOGICAL: alert, oriented, normal speech, no focal findings or movement disorder noted  PSYCHOLOGICAL: awake and alert; oriented to person, place, and time      Lab Data:  Lab Results   Component Value Date    ABO A 2024    LABRH POS 2024    ABSCRN NEG 2024     Lab Results   Component Value Date    WBC 10.3 2024    HGB 11.6 (L) 2024    HCT 33.9 (L) 2024     2024     Lab Results   Component Value Date    APTT 24 (L) 2024    PROTIME 11.5 2024    INR 1.0 2024     Lab Results   Component Value Date    FIBRINOGEN 639 (H) 2024       Assessment/Plan   Norah Khan is a 38 y.o.  at 31w3d. KIMBERLY: 8/15/2024, by Last Menstrual Period. Admitted for first vaginal bleed in s/o low lying placenta.    Vaginal bleeding  - In s/o known low lying placenta (previous placenta  previa, most recently 2mm from os on 30w1d US).  - Heavy bleeding at home and moderate amount of dark clot on exam in triage. Unable to visualize cervix, but no active bleeding.  - Hgb stable in 11s. Coags and fibrinogen unremarkable.  - T&C for 2u pRBC  - Continue pad counts.  - For CS if delivery is indicated  - BMZ given x1. For second dose in 24 hours  - Mg for 12H for neuroprotection     Anxiety & Obsessive Compulsive Neurosis  - Continue Prozac     Hx of postpartum hypertension  - Normotensive since admission     Fetal Wellbeing  - Cephalic  - Prenatal labs reviewed and UTD  - Last growth US 6/7: EFW 1659g 64%, AC 56%  - cEFM, currently Cat I. For daily NST once on Mac 4  - GBS collected  - Discussed BMZ and 12 hours of Magnesium for fetal lung, brain, and gut development given delivery may be indicated with increased bleeding or concern for fetal status. Patient amenable.   - NICU aware    Seen and discussed w/ Dr. Wesley Peña MD PGY-1  Obstetrics & Gynecology

## 2024-06-16 NOTE — SIGNIFICANT EVENT
At bedside to evaluate bleeding. Small amount of bright pink spotting on previous pad. Now with a scant amount of light brown discharge. Patient stable to discontinue Mg at this time and be transferred up to Mac 4.    Discussed with Dr. Wesley Peña MD PGY-1  Obstetrics & Gynecology

## 2024-06-16 NOTE — TELEPHONE ENCOUNTER
Ob/Gyn emergency line with page for vaginal bleeding with clots. She is a 38 y.o.  at 31w2d with a known placenta previa. Number provided went to a male's phone, and directly to voicemail. Called the number in chart, which went to the patient's voicemail. Left voicemail advising her to report to  Triage for assessment due to VB in setting of previa.     Becki Dos Santos MD

## 2024-06-16 NOTE — LETTER
June 18, 2024     Patient: Norah Khan   YOB: 1985   Date of Visit: 6/16/2024       To Whom It May Concern:    Norah Khan is currently receiving prenatal care at [facility] for her high risk pregnancy. It is our medical opinion that she should work from home as much as possible for the remainder of her pregnancy due to the unpredictable nature of her condition, and to facilitate easier and more timely transport to medical care if needed. Please accommodate as much as able.     If you have any questions or concerns, please don't hesitate to call.         Sincerely,    Raven Diallo MD

## 2024-06-16 NOTE — CARE PLAN
The patient's goals for the shift include      The clinical goals for the shift include Pt will not have active vb and fhr will remain reassuring    Pt without active vb since transfer to Mac[facility]. FHR remains reassuring today and fetal movement has been present. Pt to receive scheduled Beta #2 early in the morning. NST scheduled for the early morning.

## 2024-06-16 NOTE — H&P
Obstetrical Admission History and Physical     Norah Khan is a 38 y.o.  at 31w3d by LMP c/w 12 wk US here for vaginal bleeding in the setting of known low lying placenta.    Chief Complaint: Vaginal Bleeding    Assessment/Plan      Vaginal bleeding  - In s/o known low lying placenta (previous placenta previa, most recently 2mm from os on 30w1d US.  - Heavy bleeding at home and moderate amount of dark clot on exam in triage. No active bleeding on exam.  - Hgb stable in 11s. Coags and fibrinogen unremarkable   - T&C for 2u pRBC  - Will admit for observation of vaginal bleeding. For CS if delivery is indicated  - Start pad counts  - for MFM consult in the AM  - BMZ dose given x1. For second dose in 24 hours  - Start Mg for 12H for neuroprotection    Anxiety & Obsessive Compulsive Neurosis  - Continue Prozac    Hx of postpartum hypertension  - Normotensive since admission    Fetal Wellbeing  - Cephalic  - Prenatal labs reviewed and UTD  - Last growth US : EFW 1659g 64%, AC 56%  - cEFM, currently Cat I. NST AGA  - GBS collected  - Discussed BMZ and 12 hours of Magnesium for fetal lung, brain, and gut development given delivery may be indicated with increased bleeding or concern for fetal status. Patient amenable.   - NICU consult ordered    Dispo: admit for observation    Seen & Discussed with Dr. Prior Estefany Kraft MD, PGY-1      Adam Almazan states around 11pm, she had a gush of what she thought was fluid or urine, but noticed bright red quarter sized clots. She went to the bathroom and had continued dripping of bright red blood with clots. She has continued to pass blood while in triage.    No cramping or contractions. No LOF. Good fetal movement.    Pregnancy notable for:  - Low lying placenta: previously placenta previa. Placenta 2mm from cervical os at 30w1d scan  - Anxiety & Obsessive Compulsive Neurosis: on Prozac  - Hx of postpartum hypertension: on bbASA       Obstetrical  History   OB History    Para Term  AB Living   2 1 1     1   SAB IAB Ectopic Multiple Live Births           1      # Outcome Date GA Lbr Major/2nd Weight Sex Delivery Anes PTL Lv   2 Current            1 Term 22 40w0d  3.374 kg F Vag-Spont EPI  PAUL       Past Medical History  Past Medical History:   Diagnosis Date    Depression     Encounter for full-term uncomplicated delivery ([facility]) 06/15/2022     (spontaneous vaginal delivery)    Encounter for immunization 2021    COVID-19 vaccine administered    Encounter for initial prescription of contraceptive pills 2020    Encounter for initial prescription of contraceptive pills    Migraine     Personal history of other diseases of the respiratory system 2020    History of acute pharyngitis    Urinary tract infection     Varicella         Past Surgical History   Past Surgical History:   Procedure Laterality Date    OTHER SURGICAL HISTORY  2021    [place] tooth extraction       Social History  Social History     Tobacco Use    Smoking status: Never     Passive exposure: Never    Smokeless tobacco: Never   Substance Use Topics    Alcohol use: Not Currently     Alcohol/week: 3.0 standard drinks of alcohol     Types: 3 Standard drinks or equivalent per week     Substance and Sexual Activity   Drug Use Never       Allergies  Suprax [cefixime]     Medications  Medications Prior to Admission   Medication Sig Dispense Refill Last Dose    aspirin 81 mg EC tablet Take 2 tablets (162 mg) by mouth once daily.       FLUoxetine (PROzac) 60 mg tablet Take 1 tablet (60 mg) by mouth once daily. 90 tablet 3     prenatal vit,calc78-iron-folic (Prenatabs FA) 29-1 mg tablet Take by mouth.          Objective    Last Vitals  Temp Pulse Resp BP MAP O2 Sat   36.8 °C (98.2 °F) 97 18 112/59   98 %     Physical Examination  General: Lying in bed in NAD  Skin: No rashes/lesions/erythema  Neuro: Awake, alert, conversational  CV: Regular rate  Respiratory:  Even and unlabored on RA  : Moderate amount of dark red clot in vaginal vault. Cervix poorly visualized on speculum exam 2/2 clot and redundant vaginal tissue. Digital cervical exam deferred given low lying placenta  Extremities: No edema, discoloration, or pain in BLE  Psych: appropriate mood and affect    Cervical Exam  Presentation: Vertex  Fetal Assessment  Movement: Present  Mode: External US  Baseline Fetal Heart Rate (bpm): 145 bpm  Baseline Classification: Normal  Variability: Moderate (Between 6 and 25 BPM)  Pattern: Accelerations   Fetal Decelerations: No  Contraction Frequency: none    BSUS: cephalic, no gross placental abnormalities noted. Unable to visualize placental edge 2/2 fetal head position    Lab Review  Lab Results   Component Value Date    WBC 10.3 2024    HGB 11.6 (L) 2024    HCT 33.9 (L) 2024     2024     Lab Results   Component Value Date    APTT 24 (L) 2024    PROTIME 11.5 2024    INR 1.0 2024     Lab Results   Component Value Date    FIBRINOGEN 639 (H) 2024       I saw and evaluated the patient. I personally obtained the key and critical portions of the history and physical exam or was physically present for key and critical portions performed by the resident/fellow. I reviewed the resident/fellow's documentation and discussed the patient with the resident/fellow. I agree with the resident/fellow's medical decision making as documented in the note. In short, patient is a 38 y.o.  at 31w3d with low-lying placenta (2mm from os at 30wks), who presented with a moderate-to-large episode of vaginal bleeding with clots. She reported filling her underwear with bleeding/clots, and continuing to bleed while over the toilet. On admission, she had dark red blood in the vault, but no active bleeding noted. FHT reactive and reassuring. No contractions and no evidence of PPROM. Due to large episode of bleeding, BMZ dose #1 given and  magnesium sulfate started for fetal neuroprotection. Will continue pad counts and cEFM. Patient understands if she has continue active, large bleeding, it could be an indication for delivery. She is currently stable with reassuring fetal testing, therefore will admit for monitoring. All questions answered from her and her .     Becki Dos Santos MD

## 2024-06-16 NOTE — CONSULTS
Maternal Fetal Medicine Consult    Reason for Consult: vaginal bleeding with known low lying placenta    Assessment/Plan    Norah Khan is a 38 y.o.  at 31w3d by LMP c/w 12 wk US here for vaginal bleeding in the setting of known low lying placenta.     Vaginal bleeding  - In s/o known low lying placenta (previous placenta previa, most recently 2mm from os on 30w1d US).  - Heavy bleeding at home and moderate amount of dark clot on exam in triage. Unable to visualize cervix, but no active bleeding.  - Hgb stable in 11s. Coags and fibrinogen unremarkable   - T&C for 2u pRBC  - Will admit for observation of vaginal bleeding. Continue pad counts  - For CS if delivery is indicated  - BMZ given x1. For second dose in 24 hours  - Mg for 12H for neuroprotection     Anxiety & Obsessive Compulsive Neurosis  - Continue Prozac     Hx of postpartum hypertension  - Normotensive since admission     Fetal Wellbeing  - Cephalic  - Prenatal labs reviewed and UTD  - Last growth US : EFW 1659g 64%, AC 56%  - cEFM, currently Cat I. For daily NST once on Mac 4  - GBS collected  - Discussed BMZ and 12 hours of Magnesium for fetal lung, brain, and gut development given delivery may be indicated with increased bleeding or concern for fetal status. Patient amenable.   - NICU consult ordered     Dispo: admit for observation     To be Discussed with Dr. Olson, LI[facility] attending  Estefany Kraft MD, PGY-1  MFM team pager [phone]    Adam Almazan states around 11pm, she had a gush of what she thought was fluid or urine, but noticed bright red quarter sized clots. She went to the bathroom and had continued dripping of bright red blood with clots. She has continued to have bleeding in triage.     No cramping or contractions. No LOF. Good fetal movement.    AM Update:   Patient hd 2 pads with small dark brown smears of blood since admission. No bright red blood or clots. No cramping, contractions, or LOF. Continues to feel  fetal movement.     Pregnancy notable for:  - Low lying placenta: previously placenta previa. Placenta 2mm from cervical os at 30w1d scan  - Anxiety & Obsessive Compulsive Neurosis: on Prozac  - Hx of postpartum hypertension: on bbASA    Obstetrical History   OB History    Para Term  AB Living   2 1 1     1   SAB IAB Ectopic Multiple Live Births           1      # Outcome Date GA Lbr Major/2nd Weight Sex Delivery Anes PTL Lv   2 Current            1 Term 22 40w0d  3.374 kg F Vag-Spont EPI  PAUL       Past Medical History  Past Medical History:   Diagnosis Date    Depression     Encounter for full-term uncomplicated delivery ([facility]) 06/15/2022     (spontaneous vaginal delivery)    Encounter for immunization 2021    COVID-19 vaccine administered    Encounter for initial prescription of contraceptive pills 2020    Encounter for initial prescription of contraceptive pills    Migraine     Personal history of other diseases of the respiratory system 2020    History of acute pharyngitis    Urinary tract infection     Varicella         Past Surgical History   Past Surgical History:   Procedure Laterality Date    OTHER SURGICAL HISTORY  2021    [place] tooth extraction       Social History  Social History     Tobacco Use    Smoking status: Never     Passive exposure: Never    Smokeless tobacco: Never   Substance Use Topics    Alcohol use: Not Currently     Alcohol/week: 3.0 standard drinks of alcohol     Types: 3 Standard drinks or equivalent per week     Substance and Sexual Activity   Drug Use Never       Allergies  Suprax [cefixime]     Medications  Medications Prior to Admission   Medication Sig Dispense Refill Last Dose    aspirin 81 mg EC tablet Take 2 tablets (162 mg) by mouth once daily.       FLUoxetine (PROzac) 60 mg tablet Take 1 tablet (60 mg) by mouth once daily. 90 tablet 3     prenatal vit,calc78-iron-folic (Prenatabs FA) 29-1 mg tablet Take by mouth.           Objective    Last Vitals  Temp Pulse Resp BP MAP O2 Sat   36.8 °C (98.2 °F) 84 18 111/56   96 %     Physical Examination  Physical Examination  General: Lying in bed in NAD  Skin: No rashes/lesions/erythema  Neuro: Awake, alert, conversational  CV: Regular rate  Respiratory: Even and unlabored on RA  : Moderate amount of dark red clot in vaginal vault. Cervix poorly visualized on speculum exam 2/2 clot and redundant vaginal tissue. Digital cervical exam deferred given low lying placenta  Extremities: No edema, discoloration, or pain in BLE  Psych: appropriate mood and affect     Cervical Exam  Presentation: Vertex  Fetal Assessment  Movement: Present  Mode: External US  Baseline Fetal Heart Rate (bpm): 145 bpm  Baseline Classification: Normal  Variability: Moderate (Between 6 and 25 BPM)  Pattern: Accelerations   Fetal Decelerations: No  Contraction Frequency: none     BSUS: cephalic, no gross placental abnormalities noted. Unable to visualize placental edge 2/2 fetal head position    Lab Review  Lab Results   Component Value Date    WBC 10.3 06/16/2024    HGB 11.6 (L) 06/16/2024    HCT 33.9 (L) 06/16/2024     06/16/2024     Lab Results   Component Value Date    APTT 24 (L) 06/16/2024    PROTIME 11.5 06/16/2024    INR 1.0 06/16/2024     Lab Results   Component Value Date    FIBRINOGEN 639 (H) 06/16/2024

## 2024-06-17 ENCOUNTER — APPOINTMENT (OUTPATIENT)
Dept: RADIOLOGY | Facility: HOSPITAL | Age: 39
DRG: 833 | End: 2024-06-17
Payer: COMMERCIAL

## 2024-06-17 PROCEDURE — 76817 TRANSVAGINAL US OBSTETRIC: CPT | Performed by: OBSTETRICS & GYNECOLOGY

## 2024-06-17 PROCEDURE — 76817 TRANSVAGINAL US OBSTETRIC: CPT

## 2024-06-17 PROCEDURE — 1220000001 HC OB SEMI-PRIVATE ROOM DAILY

## 2024-06-17 PROCEDURE — 2500000004 HC RX 250 GENERAL PHARMACY W/ HCPCS (ALT 636 FOR OP/ED)

## 2024-06-17 PROCEDURE — 99232 SBSQ HOSP IP/OBS MODERATE 35: CPT | Performed by: OBSTETRICS & GYNECOLOGY

## 2024-06-17 PROCEDURE — 2500000001 HC RX 250 WO HCPCS SELF ADMINISTERED DRUGS (ALT 637 FOR MEDICARE OP)

## 2024-06-17 PROCEDURE — 76816 OB US FOLLOW-UP PER FETUS: CPT | Performed by: OBSTETRICS & GYNECOLOGY

## 2024-06-17 PROCEDURE — 76816 OB US FOLLOW-UP PER FETUS: CPT

## 2024-06-17 RX ORDER — ACETAMINOPHEN 325 MG/1
975 TABLET ORAL ONCE
Status: COMPLETED | OUTPATIENT
Start: 2024-06-17 | End: 2024-06-17

## 2024-06-17 RX ADMIN — ASPIRIN 81 MG 162 MG: 81 TABLET ORAL at 20:20

## 2024-06-17 RX ADMIN — PRENATAL VIT W/ FE FUMARATE-FA TAB 27-0.8 MG 1 TABLET: 27-0.8 TAB at 08:13

## 2024-06-17 RX ADMIN — FLUOXETINE 60 MG: 20 CAPSULE ORAL at 20:20

## 2024-06-17 RX ADMIN — BETAMETHASONE ACETATE AND BETAMETHASONE SODIUM PHOSPHATE 12 MG: 3; 3 INJECTION, SUSPENSION INTRA-ARTICULAR; INTRALESIONAL; INTRAMUSCULAR; SOFT TISSUE at 01:42

## 2024-06-17 RX ADMIN — POLYETHYLENE GLYCOL 3350 17 G: 17 POWDER, FOR SOLUTION ORAL at 08:14

## 2024-06-17 RX ADMIN — ACETAMINOPHEN 975 MG: 325 TABLET ORAL at 20:21

## 2024-06-17 ASSESSMENT — PAIN SCALES - GENERAL
PAINLEVEL_OUTOF10: 0 - NO PAIN
PAINLEVEL_OUTOF10: 3
PAINLEVEL_OUTOF10: 0 - NO PAIN

## 2024-06-17 ASSESSMENT — PAIN - FUNCTIONAL ASSESSMENT
PAIN_FUNCTIONAL_ASSESSMENT: 0-10

## 2024-06-17 NOTE — CARE PLAN
The patient's goals for the shift include Maintain pregnancy    The clinical goals for the shift include FHR remains reassuring      Problem: Antepartum  Goal: Maintain pregnancy as long as maternal and/or fetal condition is stable  Outcome: Progressing  Goal: Avoid/minimize constipation  Outcome: Progressing  Goal: No decrease in circulation/VTE  Outcome: Progressing  Goal: FHR remains reassuring  Outcome: Progressing  Goal: Minimize anxiety/maximize coping  Outcome: Progressing     Problem: Nausea/Vomiting  Goal: Adequate urine output (0.5 ml/kg/hr)  Outcome: Progressing  Goal: Free from nausea/vomiting  Outcome: Progressing  Goal: Tolerates prescribed diet  Outcome: Progressing     Problem: Pain - Adult  Goal: Verbalizes/displays adequate comfort level or baseline comfort level  Outcome: Progressing     Problem: Safety - Adult  Goal: Free from fall injury  Outcome: Progressing     Problem: Discharge Planning  Goal: Discharge to home or other facility with appropriate resources  Outcome: Progressing

## 2024-06-17 NOTE — CARE PLAN
Problem: Antepartum  Goal: Maintain pregnancy as long as maternal and/or fetal condition is stable  Outcome: Progressing  Goal: Avoid/minimize constipation  Outcome: Progressing  Goal: No decrease in circulation/VTE  Outcome: Progressing  Goal: FHR remains reassuring  Outcome: Progressing  Goal: Minimize anxiety/maximize coping  Outcome: Progressing     Problem: Nausea/Vomiting  Goal: Adequate urine output (0.5 ml/kg/hr)  Outcome: Progressing  Goal: Free from nausea/vomiting  Outcome: Progressing  Goal: Tolerates prescribed diet  Outcome: Progressing     Problem: Pain - Adult  Goal: Verbalizes/displays adequate comfort level or baseline comfort level  Outcome: Progressing     Problem: Safety - Adult  Goal: Free from fall injury  Outcome: Progressing     Problem: Discharge Planning  Goal: Discharge to home or other facility with appropriate resources  Outcome: Progressing   The patient's goals for the shift include stay pregnant.    The clinical goals for the shift include Pt. will have no new bright red vaginal bleeding.    VSS and her NST was appropriate this morning. Her steroids are completed. Her bleeding changed to red this morning. No cramps and good fetal movement. She had an ultrasound. Stable.

## 2024-06-17 NOTE — PROGRESS NOTES
ANTEPARTUM PROGRESS NOTE   2024, 7:14 AM     SUBJECTIVE: Patient has no complaints this morning. Denies cramping, contractions, or LOF. Reports normal fetal movement. Endorses some dark red/brown light spotting on pads and specks of brown, but no further episodes of bright red bleeding or pink spotting.    Late AM update: Very small amount of pink/red spotting visualized on pad prior to pt going down for US. Per nursing, additional dime-sized amount of red blood was on bathroom floor, and small amount of blood in toilet as well.     OBJECTIVE:   /63   Pulse 89   Temp 36.1 °C (97 °F) (Temporal)   Resp 18   LMP 2023 (Exact Date)   SpO2 97%    Temp  Min: 36.1 °C (97 °F)  Max: 36.9 °C (98.4 °F)  Pulse  Min: 83  Max: 103  BP  Min: 102/63  Max: 126/61    General: No acute distress  Cardiovascular: Warm and well perfused  Respiratory: Normal respiratory effort   Abdominal:  Soft, gravid  Extremities: Warm, well perfused    NST: Baseline 125/mod variability/+ accels/- decels  [place]: no contractions     Labs:   Results from last 72 hours   Lab Units 24  0050   WBC AUTO x10*3/uL 10.3   HEMOGLOBIN g/dL 11.6*   HEMATOCRIT % 33.9*   PLATELETS AUTO x10*3/uL 201   FIBRINOGEN mg/dL 639*   INR  1.0   PROTIME seconds 11.5   APTT seconds 24*       ASSESSMENT AND PLAN:     38 y.o.  at 31w4d by LMP c/w 12wk US admitted for first episode of vaginal bleeding in s/o low lying placenta.     Vaginal bleeding  - In s/o known low lying placenta (previous placenta previa, most recently 2mm from os on 30w1d US)  - Heavy bleeding at home and moderate amount of dark clot on exam in triage. Unable to visualize cervix, but no active bleeding  - Hgb stable in 11s. Coags and fibrinogen unremarkable  - s/p T&C for 2u pRBC  - Continue pad counts  - For CS if delivery is indicated  - s/p BMZx2 -  - s/p 12 hrs Mg for neuroprotection  - Continue to monitor inpatient in s/o new spotting this AM     Anxiety & OCD  -  Continue Prozac     Hx of postpartum hypertension  - Normotensive since admission     Fetal Wellbeing  - Cephalic on 6/16 BSUS  - Prenatal labs reviewed and UTD  - Daily NST, AGA today  - 1 hr GTT wnl, 93  - Last growth US 6/7: EFW 1659g 64%, AC 56%  - For TVUS today  - NICU aware  - GBS collected 6/16  - s/p TDaP 6/10  - PPBC: considering POPs    Dispo: pending clinical stability    The patient was seen and evaluated with the [facility] attending physician, Dr. Gray.    Jaqui Baumann, MS4  [facility]  E[employer] Secure Chat      Principal Problem:    Vaginal bleeding in pregnancy ([facility])

## 2024-06-18 VITALS
RESPIRATION RATE: 18 BRPM | DIASTOLIC BLOOD PRESSURE: 69 MMHG | OXYGEN SATURATION: 98 % | TEMPERATURE: 97.3 F | HEART RATE: 91 BPM | SYSTOLIC BLOOD PRESSURE: 109 MMHG

## 2024-06-18 LAB — GP B STREP GENITAL QL CULT: NORMAL

## 2024-06-18 PROCEDURE — 99238 HOSP IP/OBS DSCHRG MGMT 30/<: CPT

## 2024-06-18 PROCEDURE — 59025 FETAL NON-STRESS TEST: CPT | Mod: GC

## 2024-06-18 PROCEDURE — 59025 FETAL NON-STRESS TEST: CPT

## 2024-06-18 ASSESSMENT — PAIN SCALES - GENERAL
PAINLEVEL_OUTOF10: 0 - NO PAIN
PAINLEVEL_OUTOF10: 0 - NO PAIN

## 2024-06-18 ASSESSMENT — PAIN - FUNCTIONAL ASSESSMENT
PAIN_FUNCTIONAL_ASSESSMENT: 0-10
PAIN_FUNCTIONAL_ASSESSMENT: 0-10

## 2024-06-18 NOTE — CARE PLAN
The patient's goals for the shift include maintain pregnancy    The clinical goals for the shift include no vaginal bleeing      Problem: Antepartum  Goal: Maintain pregnancy as long as maternal and/or fetal condition is stable  Outcome: Adequate for Discharge  Goal: Avoid/minimize constipation  Outcome: Adequate for Discharge  Goal: No decrease in circulation/VTE  Outcome: Adequate for Discharge  Goal: FHR remains reassuring  Outcome: Adequate for Discharge  Goal: Minimize anxiety/maximize coping  Outcome: Adequate for Discharge     Problem: Nausea/Vomiting  Goal: Adequate urine output (0.5 ml/kg/hr)  Outcome: Adequate for Discharge  Goal: Free from nausea/vomiting  Outcome: Adequate for Discharge  Goal: Tolerates prescribed diet  Outcome: Adequate for Discharge     Problem: Pain - Adult  Goal: Verbalizes/displays adequate comfort level or baseline comfort level  Outcome: Adequate for Discharge     Problem: Safety - Adult  Goal: Free from fall injury  Outcome: Adequate for Discharge     Problem: Discharge Planning  Goal: Discharge to home or other facility with appropriate resources  Outcome: Adequate for Discharge

## 2024-06-18 NOTE — DISCHARGE SUMMARY
Discharge Summary    Admission Date: 2024  Discharge Date: 2024    Discharge Diagnosis  Vaginal bleeding in pregnancy ([facility])    Hospital Course  Norah Khan is a 38 y.o.  at 31w5d by LMP c/w 12wk US who was admitted - for her first episode of vaginal bleeding in s/o low lying placenta (resolved previa, placenta 2 mm from cervical os on prior 30.1wk US) She presented after heavy bleeding at home with several large clots passed. On exam she was vitally stable, and SSE showed a moderate amount of dark clot with no active bleeding noted, although the cervix was unable to be visualized. Her labs were unremarkable with Hbg stable in the 11s and coags/fibrinogen within normal limits. She received BMZ x2 - and GBS was collected . Her bleeding remained minimal throughout her admission. TVUS performed inpatient preliminarily showed placental distance of ~1 cm from os, although views were limited by low fetal head. She was instructed to continue routine prenatal care with her obstetrician, Dr. Regan, with plans for weekly NSTs and a 34 week US to re-assess placental location.     Pertinent Physical Exam At Time of Discharge  General: no acute distress  HEENT: normocephalic, atraumatic  Heart: warm and well perfused  Lungs: no increased WOB  Abdomen: gravid, soft, nontender  Extremities: moving all extremities  Neuro: awake and conversant  Psych: appropriate mood and affect     Discharge Meds     Your medication list        CONTINUE taking these medications        Instructions Last Dose Given Next Dose Due   aspirin 81 mg EC tablet           FLUoxetine 60 mg tablet  Commonly known as: PROzac      Take 1 tablet (60 mg) by mouth once daily.       Prenatabs FA 29-1 mg tablet  Generic drug: prenatal vit,calc78-iron-folic                     Complications Requiring Follow-Up  Weekly NSTs  34 week ultrasound    Test Results Pending At Discharge  Pending Labs       Order Current Status     POCT urinalysis dipstick In process    Group B Streptococcus (GBS) Prenatal Screen, Culture Preliminary result            Outpatient Follow-Up  Future Appointments   Date Time Provider Department Center   6/28/2024  3:30 PM Kelly Regan MD [id number] [facility]   7/12/2024  2:45 PM Elena Hyde [id number] [facility]   7/19/2024  8:30 AM MAC [id number] OBGYNIMG ULTRASOUND 3 [id number] MAC [place]   7/19/2024  9:30 AM Kelly Regan MD [id number] [facility]   7/25/2024  3:45 PM Elena Hyde [id number] [facility]   8/2/2024  3:45 PM MD JEANNIE [id number] [facility]   8/9/2024  8:45 AM MD JEANNIE [id number] [facility]   8/16/2024  8:45 AM MD JEANNIE [id number] [facility]   8/23/2024  8:45 AM Kelly Regan MD [id number] [facility]   8/29/2024 10:00 AM Yoko Singh, PhD [id number] [place]   9/26/2024 10:00 AM Yoko Singh, PhD [id number] [facility]   9/30/2024  8:00 AM Carlie France MD [id number] [facility]   11/21/2024  9:00 AM Yoko Singh, PhD [id number] [place]           Raven Diallo MD

## 2024-06-18 NOTE — DISCHARGE INSTRUCTIONS
Please call the office if you are having any vaginal bleeding that is bright red, clots, or any other new bleeding that is concerning to you. Also, watch for contractions or large gushes of fluid concerning for your water breaking.     Please follow up with your provider within 1 week after leaving the hospital. You will be scheduled for weekly non-stress tests to evaluate the wellbeing of your baby for the remainder of your pregnancy. Additionally, we will schedule a follow-up ultrasound at 34 weeks. Our office should call you with these appointments. If you do not hear from our team, please call [phone].

## 2024-06-18 NOTE — CARE PLAN
The patient's goals for the shift include maintain pregnancy    The clinical goals for the shift include decrease in bleeding      Problem: Antepartum  Goal: Maintain pregnancy as long as maternal and/or fetal condition is stable  Outcome: Progressing  Goal: Avoid/minimize constipation  Outcome: Progressing  Goal: No decrease in circulation/VTE  Outcome: Progressing  Goal: FHR remains reassuring  Outcome: Progressing  Goal: Minimize anxiety/maximize coping  Outcome: Progressing     Problem: Nausea/Vomiting  Goal: Adequate urine output (0.5 ml/kg/hr)  Outcome: Progressing  Goal: Free from nausea/vomiting  Outcome: Progressing  Goal: Tolerates prescribed diet  Outcome: Progressing     Problem: Pain - Adult  Goal: Verbalizes/displays adequate comfort level or baseline comfort level  Outcome: Progressing     Problem: Safety - Adult  Goal: Free from fall injury  Outcome: Progressing     Problem: Discharge Planning  Goal: Discharge to home or other facility with appropriate resources  Outcome: Progressing

## 2024-06-18 NOTE — HOSPITAL COURSE
Norah Khan is a 38 y.o.  at 31w5d by LMP c/w 12wk US who was admitted - for her first episode of vaginal bleeding in s/o low lying placenta with placenta 2 mm from cervical os on last formal US at 30w1d. She presented after heavy bleeding at home with several large clots passed. On exam she was vitally stable, and SSE showed a moderate amount of dark clot with no active bleeding noted, although the cervix was unable to be visualized. Her labs were unremarkable with Hbg stable in the 11s and caogs/fibrinogen within normal limits. She received BMZx2 - and GBS was collected . TVUS performed inpatient preliminarily showed placental distance of ~1 cm from os, although views were limited by low fetal head. She remained vitally and clinically stable throughout admission with only one episode of very small pink/red spotting the morning of , and was discharged in stable condition with no further bleeding.     She was instructed to continue routine prenatal care with her obstetrician, Dr. Regan, and to follow up with a 34 week US to assess placental location. She was also counseled on return precautions for any further episodes of bleeding.

## 2024-06-19 LAB
BLOOD EXPIRATION DATE: NORMAL
BLOOD EXPIRATION DATE: NORMAL
DISPENSE STATUS: NORMAL
DISPENSE STATUS: NORMAL
PRODUCT BLOOD TYPE: 6200
PRODUCT BLOOD TYPE: 6200
PRODUCT CODE: NORMAL
PRODUCT CODE: NORMAL
UNIT ABO: NORMAL
UNIT ABO: NORMAL
UNIT NUMBER: NORMAL
UNIT NUMBER: NORMAL
UNIT RH: NORMAL
UNIT RH: NORMAL
UNIT VOLUME: 350
UNIT VOLUME: 350
XM INTEP: NORMAL
XM INTEP: NORMAL

## 2024-06-24 ENCOUNTER — ROUTINE PRENATAL (OUTPATIENT)
Dept: OBSTETRICS AND GYNECOLOGY | Facility: CLINIC | Age: 39
DRG: 998 | End: 2024-06-24
Payer: COMMERCIAL

## 2024-06-24 ENCOUNTER — HOSPITAL ENCOUNTER (OUTPATIENT)
Facility: HOSPITAL | Age: 39
Discharge: HOME | DRG: 998 | End: 2024-06-24
Attending: OBSTETRICS & GYNECOLOGY | Admitting: OBSTETRICS & GYNECOLOGY
Payer: COMMERCIAL

## 2024-06-24 VITALS
TEMPERATURE: 97.9 F | RESPIRATION RATE: 17 BRPM | WEIGHT: 173.28 LBS | OXYGEN SATURATION: 97 % | HEIGHT: 65 IN | BODY MASS INDEX: 28.87 KG/M2 | DIASTOLIC BLOOD PRESSURE: 69 MMHG | HEART RATE: 86 BPM | SYSTOLIC BLOOD PRESSURE: 116 MMHG

## 2024-06-24 VITALS
BODY MASS INDEX: 28.96 KG/M2 | SYSTOLIC BLOOD PRESSURE: 120 MMHG | HEART RATE: 78 BPM | WEIGHT: 174 LBS | DIASTOLIC BLOOD PRESSURE: 78 MMHG

## 2024-06-24 DIAGNOSIS — F41.1 GENERALIZED ANXIETY DISORDER: ICD-10-CM

## 2024-06-24 DIAGNOSIS — O22.03: Primary | ICD-10-CM

## 2024-06-24 DIAGNOSIS — O09.523 MULTIGRAVIDA OF ADVANCED MATERNAL AGE IN THIRD TRIMESTER (HHS-HCC): ICD-10-CM

## 2024-06-24 DIAGNOSIS — O09.93 SUPERVISION OF HIGH RISK PREGNANCY IN THIRD TRIMESTER (HHS-HCC): ICD-10-CM

## 2024-06-24 DIAGNOSIS — O46.90 VAGINAL BLEEDING IN PREGNANCY (HHS-HCC): ICD-10-CM

## 2024-06-24 DIAGNOSIS — Z87.59 HISTORY OF POSTPARTUM HYPERTENSION: ICD-10-CM

## 2024-06-24 DIAGNOSIS — Z86.79 HISTORY OF POSTPARTUM HYPERTENSION: ICD-10-CM

## 2024-06-24 DIAGNOSIS — Z3A.32 32 WEEKS GESTATION OF PREGNANCY (HHS-HCC): ICD-10-CM

## 2024-06-24 DIAGNOSIS — O44.03 PLACENTA PREVIA IN THIRD TRIMESTER (HHS-HCC): ICD-10-CM

## 2024-06-24 LAB
BILIRUBIN, POC: NEGATIVE
BLOOD URINE, POC: POSITIVE
CLARITY, POC: CLEAR
COLOR, POC: YELLOW
GLUCOSE URINE, POC: NEGATIVE
KETONES, POC: NEGATIVE
LEUKOCYTE EST, POC: NORMAL
NITRITE, POC: NEGATIVE
PH, POC: 8
SPECIFIC GRAVITY, POC: 1.01
URINE PROTEIN, POC: NEGATIVE
UROBILINOGEN, POC: 0.2

## 2024-06-24 PROCEDURE — 99213 OFFICE O/P EST LOW 20 MIN: CPT

## 2024-06-24 PROCEDURE — 81002 URINALYSIS NONAUTO W/O SCOPE: CPT | Performed by: NURSE PRACTITIONER

## 2024-06-24 PROCEDURE — 1120000001 HC OB PRIVATE ROOM DAILY

## 2024-06-24 PROCEDURE — 99213 OFFICE O/P EST LOW 20 MIN: CPT | Performed by: STUDENT IN AN ORGANIZED HEALTH CARE EDUCATION/TRAINING PROGRAM

## 2024-06-24 PROCEDURE — 99214 OFFICE O/P EST MOD 30 MIN: CPT | Performed by: NURSE PRACTITIONER

## 2024-06-24 PROCEDURE — 0501F PRENATAL FLOW SHEET: CPT | Performed by: OBSTETRICS & GYNECOLOGY

## 2024-06-24 RX ORDER — HYDRALAZINE HYDROCHLORIDE 20 MG/ML
5 INJECTION INTRAMUSCULAR; INTRAVENOUS ONCE AS NEEDED
Status: DISCONTINUED | OUTPATIENT
Start: 2024-06-24 | End: 2024-06-25 | Stop reason: HOSPADM

## 2024-06-24 RX ORDER — NIFEDIPINE 10 MG/1
10 CAPSULE ORAL ONCE AS NEEDED
Status: DISCONTINUED | OUTPATIENT
Start: 2024-06-24 | End: 2024-06-25 | Stop reason: HOSPADM

## 2024-06-24 RX ORDER — LABETALOL HYDROCHLORIDE 5 MG/ML
20 INJECTION, SOLUTION INTRAVENOUS ONCE AS NEEDED
Status: DISCONTINUED | OUTPATIENT
Start: 2024-06-24 | End: 2024-06-25 | Stop reason: HOSPADM

## 2024-06-24 SDOH — SOCIAL STABILITY: SOCIAL INSECURITY: PHYSICAL ABUSE: DENIES

## 2024-06-24 SDOH — SOCIAL STABILITY: SOCIAL INSECURITY: DO YOU FEEL ANYONE HAS EXPLOITED OR TAKEN ADVANTAGE OF YOU FINANCIALLY OR OF YOUR PERSONAL PROPERTY?: NO

## 2024-06-24 SDOH — SOCIAL STABILITY: SOCIAL INSECURITY: HAVE YOU HAD THOUGHTS OF HARMING ANYONE ELSE?: NO

## 2024-06-24 SDOH — HEALTH STABILITY: MENTAL HEALTH: HAVE YOU USED ANY PRESCRIPTION DRUGS OTHER THAN PRESCRIBED IN THE PAST 12 MONTHS?: NO

## 2024-06-24 SDOH — HEALTH STABILITY: MENTAL HEALTH: STRENGTHS (MUST CHOOSE TWO): SUPPORT FROM FAMILY;SUPPORT FROM FRIENDS

## 2024-06-24 SDOH — SOCIAL STABILITY: SOCIAL INSECURITY: HAVE YOU HAD ANY THOUGHTS OF HARMING ANYONE ELSE?: NO

## 2024-06-24 SDOH — ECONOMIC STABILITY: HOUSING INSECURITY: DO YOU FEEL UNSAFE GOING BACK TO THE PLACE WHERE YOU ARE LIVING?: NO

## 2024-06-24 SDOH — SOCIAL STABILITY: SOCIAL INSECURITY: ARE THERE ANY APPARENT SIGNS OF INJURIES/BEHAVIORS THAT COULD BE RELATED TO ABUSE/NEGLECT?: NO

## 2024-06-24 SDOH — HEALTH STABILITY: MENTAL HEALTH: NON-SPECIFIC ACTIVE SUICIDAL THOUGHTS (PAST 1 MONTH): NO

## 2024-06-24 SDOH — SOCIAL STABILITY: SOCIAL INSECURITY: HAS ANYONE EVER THREATENED TO HURT YOUR FAMILY OR YOUR PETS?: NO

## 2024-06-24 SDOH — HEALTH STABILITY: MENTAL HEALTH: WISH TO BE DEAD (PAST 1 MONTH): NO

## 2024-06-24 SDOH — HEALTH STABILITY: MENTAL HEALTH: WERE YOU ABLE TO COMPLETE ALL THE BEHAVIORAL HEALTH SCREENINGS?: YES

## 2024-06-24 SDOH — SOCIAL STABILITY: SOCIAL INSECURITY: VERBAL ABUSE: DENIES

## 2024-06-24 SDOH — SOCIAL STABILITY: SOCIAL INSECURITY: DOES ANYONE TRY TO KEEP YOU FROM HAVING/CONTACTING OTHER FRIENDS OR DOING THINGS OUTSIDE YOUR HOME?: NO

## 2024-06-24 SDOH — HEALTH STABILITY: MENTAL HEALTH: HAVE YOU USED ANY SUBSTANCES (CANABIS, COCAINE, HEROIN, HALLUCINOGENS, INHALANTS, ETC.) IN THE PAST 12 MONTHS?: NO

## 2024-06-24 SDOH — HEALTH STABILITY: MENTAL HEALTH: SUICIDAL BEHAVIOR (LIFETIME): NO

## 2024-06-24 SDOH — SOCIAL STABILITY: SOCIAL INSECURITY: ABUSE SCREEN: ADULT

## 2024-06-24 SDOH — SOCIAL STABILITY: SOCIAL INSECURITY: ARE YOU OR HAVE YOU BEEN THREATENED OR ABUSED PHYSICALLY, EMOTIONALLY, OR SEXUALLY BY ANYONE?: NO

## 2024-06-24 ASSESSMENT — LIFESTYLE VARIABLES
AUDIT-C TOTAL SCORE: 0
HOW OFTEN DO YOU HAVE A DRINK CONTAINING ALCOHOL: NEVER
AUDIT-C TOTAL SCORE: 0
HOW OFTEN DO YOU HAVE 6 OR MORE DRINKS ON ONE OCCASION: NEVER
SKIP TO QUESTIONS 9-10: 1
HOW MANY STANDARD DRINKS CONTAINING ALCOHOL DO YOU HAVE ON A TYPICAL DAY: PATIENT DOES NOT DRINK

## 2024-06-24 ASSESSMENT — PAIN SCALES - GENERAL
PAINLEVEL_OUTOF10: 0 - NO PAIN

## 2024-06-24 ASSESSMENT — PATIENT HEALTH QUESTIONNAIRE - PHQ9
SUM OF ALL RESPONSES TO PHQ9 QUESTIONS 1 & 2: 0
1. LITTLE INTEREST OR PLEASURE IN DOING THINGS: NOT AT ALL
2. FEELING DOWN, DEPRESSED OR HOPELESS: NOT AT ALL

## 2024-06-24 NOTE — PROGRESS NOTES
"    Here for follow-up after admission for vaginal bleeding  Previously with placenta previa    NST today with initial baseline around 140 with an acceleration, but then ?? Fetal tachycardia to 180.  The tracing then becomes discontinuous, and when started tracing again, baseline around 90 with slow return to 130s.    Norah states that she felt fetal movement throughout, and RN heard fetal movement on the monitor.    Discussed plan for prolonged monitoring on L&D to get a better sense of overall pattern.  Also, possibility of BPP if demmed appropriate.      Norah also expressed concern about \"white\" mass on vulva/vagina.  Pelvic exam performed.  Prior suspected varicosity of anterior vaginal wall at 12-1 o'clock, almost as if attached by a stalk, but does no palpate like a mass.  Picture added to media tab.  Also evidence of varicosity over medial aspect of labum minorum on left side.    To better delineate, may order pelvic MRI to see extent of involvement.    Will also determine if appropriate for vaginal birth, due to possibility of bleeding, should trauma occur.      L&D team notified.    Will monitor varicosities.    "

## 2024-06-24 NOTE — H&P
Obstetrical Triage History and Physical     Norah Khan is a 38 y.o.  at 32w4d. KIMBERLY: 8/15/2024, by Last Menstrual Period. Estimated fetal weight on  at 30.1 wga: 1659g (64% with AC 56%). She has had prenatal care with Dr. Regan .    Chief Complaint: Non- reassuring fetal heart tracing    Assessment/Plan      Prolonged Monitoring  - non reassuring, fetal decel on office NST today  -  with reactive and reassuring with 2 hours of continuous monitoring, will monitor until at least 2130  - good FM per pt  - scanned cephalic  - plan for BSUS and GERARD per L&D team    Maternal Well-being  - Vital signs stable and WNL  - Emotional support and reassurance provided  - All questions and concerns addressed    IUP @ 32w4d  - prenatal lab work reviewed and wnl  - GBS neg   - s/p BMZ ,     Staffed with Dr. Chatman. Report to Dr. Miranda for continuation of care    Bindu Rowe, STEPHANIE-CNP    GERARD 9 per Dr. Jacob, appropriate. CEFM continued for 4hrs, remained reactive without decels, reassuring. Stable for DC. D/w Dr. Lurdes Miranda MD  PGY-4, Obstetrics and Gynecology    Active Problems:  There are no active Hospital Problems.      Pregnancy Problems (from 24 to present)       Problem Noted Resolved    Vaginal bleeding in pregnancy ([facility]) 2024 by Estefany Kraft MD No    Priority:  Medium      Overview Signed 2024  8:00 AM by Raven Diallo MD     - admitted  for large volume bleeding at home, bleeding scant in hospital with normal labs  - placenta low-lying (~1.3mm from os, limited measurements on TVUS ). Previa resolved.          Obstetric varicose veins, third trimester ([facility]) 2024 by Kelly Regan MD No    Priority:  Medium      History of postpartum hypertension 2024 by Kelly Regan MD No    Priority:  Medium      Overview Signed 2024 11:21 AM by Kelly Regan MD     Takes bASA 162 mg         Placenta previa in third  trimester ([facility]) 2024 by Kelly Regan MD No    Priority:  Medium      Overview Addendum 6/10/2024  4:52 PM by Kelly Regan MD     Diagnosed @ 19+ wks 3/21/24 (anatomy scan)  Anterior placenta  No history of prior uterine surgery  Had spotting prior to diagnosis of placenta previa at time of anatomy scan  Resolved @ 29 wks scan, low lying (2 mm away)         Multigravida of advanced maternal age in third trimester ([facility]) 3/8/2024 by Kelly Regan MD No    Priority:  Medium      Generalized anxiety disorder 2024 by Yoko Singh, PhD No    Priority:  Medium            Subjective   Norah is here from the office for prolonged monitoring following a non-reassuring FHT. She denies vaginal bleeding, loss of fluid, contractions, and reports good FM.      Obstetrical History   OB History    Para Term  AB Living   2 1 1     1   SAB IAB Ectopic Multiple Live Births           1      # Outcome Date GA Lbr Major/2nd Weight Sex Delivery Anes PTL Lv   2 Current            1 Term 22 40w0d  3.374 kg F Vag-Spont EPI  PAUL       Past Medical History  Past Medical History:   Diagnosis Date    Depression     Encounter for full-term uncomplicated delivery ([facility]) 06/15/2022     (spontaneous vaginal delivery)    Encounter for immunization 2021    COVID-19 vaccine administered    Encounter for initial prescription of contraceptive pills 2020    Encounter for initial prescription of contraceptive pills    Migraine     Personal history of other diseases of the respiratory system 2020    History of acute pharyngitis    Urinary tract infection     Varicella         Past Surgical History   Past Surgical History:   Procedure Laterality Date    OTHER SURGICAL HISTORY  2021    [place] tooth extraction       Social History  Social History     Tobacco Use    Smoking status: Never     Passive exposure: Never    Smokeless tobacco: Never   Substance Use Topics    Alcohol use: Not  Currently     Alcohol/week: 3.0 standard drinks of alcohol     Types: 3 Standard drinks or equivalent per week     Substance and Sexual Activity   Drug Use Never       Allergies  Suprax [cefixime]     Medications  Medications Prior to Admission   Medication Sig Dispense Refill Last Dose    aspirin 81 mg EC tablet Take 2 tablets (162 mg) by mouth once daily.       FLUoxetine (PROzac) 60 mg tablet Take 1 tablet (60 mg) by mouth once daily. 90 tablet 3     prenatal vit,calc78-iron-folic (Prenatabs FA) 29-1 mg tablet Take by mouth.          Objective    Last Vitals  Temp Pulse Resp BP MAP O2 Sat   36.6 °C (97.9 °F) 91 18 108/76   97 %     Physical Examination  Physical Exam  Constitutional:       Appearance: Normal appearance.   HENT:      Head: Normocephalic and atraumatic.      Mouth/Throat:      Mouth: Mucous membranes are moist.   Cardiovascular:      Rate and Rhythm: Normal rate.   Pulmonary:      Effort: Pulmonary effort is normal.   Abdominal:      Palpations: Abdomen is soft.      Tenderness: There is no abdominal tenderness.   Genitourinary:     Comments: Fetal Assessment  Movement: Present  Mode: External US  Baseline Fetal Heart Rate (bpm): 140 bpm  Baseline Classification: Normal  Variability: Moderate (Between 6 and 25 BPM)  Pattern: Accelerations  FHR Category: Category I   Fetal Decelerations: No  Contraction Frequency: none    Musculoskeletal:         General: Normal range of motion.      Cervical back: Normal range of motion.   Skin:     General: Skin is warm and dry.      Capillary Refill: Capillary refill takes less than 2 seconds.   Neurological:      Mental Status: She is alert and oriented to person, place, and time.   Psychiatric:         Behavior: Behavior normal.         Lab Review  Admission on 06/24/2024   Component Date Value Ref Range Status    Color, UA 06/24/2024 Yellow   Final    Clarity, UA 06/24/2024 Clear   Final    Glucose, UA 06/24/2024 NEGATIVE   Final    Bilirubin, UA 06/24/2024  NEGATIVE   Final    Ketones, UA 06/24/2024 Negative   Final    Spec Grav, UA 06/24/2024 1.015   Final    Blood, UA 06/24/2024 Positive   Final    pH, UA 06/24/2024 8.0   Final    Protein, UA 06/24/2024 NEGATIVE   Final    Urobilinogen, UA 06/24/2024 0.2   Final    Leukocytes, UA 06/24/2024 (1+) Rare   Final    Nitrite, UA 06/24/2024 NEGATIVE   Final

## 2024-06-25 ENCOUNTER — HOSPITAL ENCOUNTER (INPATIENT)
Facility: HOSPITAL | Age: 39
End: 2024-06-25
Attending: OBSTETRICS & GYNECOLOGY | Admitting: OBSTETRICS & GYNECOLOGY
Payer: COMMERCIAL

## 2024-06-25 DIAGNOSIS — O22.03: Primary | ICD-10-CM

## 2024-06-28 ENCOUNTER — APPOINTMENT (OUTPATIENT)
Dept: OBSTETRICS AND GYNECOLOGY | Facility: CLINIC | Age: 39
End: 2024-06-28
Payer: COMMERCIAL

## 2024-07-01 ENCOUNTER — ROUTINE PRENATAL (OUTPATIENT)
Dept: OBSTETRICS AND GYNECOLOGY | Facility: CLINIC | Age: 39
End: 2024-07-01
Payer: COMMERCIAL

## 2024-07-01 ENCOUNTER — HOSPITAL ENCOUNTER (OUTPATIENT)
Dept: RADIOLOGY | Facility: CLINIC | Age: 39
Discharge: HOME | End: 2024-07-01
Payer: COMMERCIAL

## 2024-07-01 VITALS — BODY MASS INDEX: 29.44 KG/M2 | SYSTOLIC BLOOD PRESSURE: 125 MMHG | DIASTOLIC BLOOD PRESSURE: 78 MMHG | WEIGHT: 176.9 LBS

## 2024-07-01 DIAGNOSIS — O46.90 VAGINAL BLEEDING IN PREGNANCY (HHS-HCC): ICD-10-CM

## 2024-07-01 DIAGNOSIS — Z36.2 ENCOUNTER FOR OTHER ANTENATAL SCREENING FOLLOW-UP (HHS-HCC): ICD-10-CM

## 2024-07-01 DIAGNOSIS — O44.53: ICD-10-CM

## 2024-07-01 DIAGNOSIS — O09.523 AMA (ADVANCED MATERNAL AGE) MULTIGRAVIDA 35+, THIRD TRIMESTER (HHS-HCC): ICD-10-CM

## 2024-07-01 DIAGNOSIS — Z3A.33 33 WEEKS GESTATION OF PREGNANCY (HHS-HCC): Primary | ICD-10-CM

## 2024-07-01 DIAGNOSIS — O44.03 PLACENTA PREVIA IN THIRD TRIMESTER (HHS-HCC): ICD-10-CM

## 2024-07-01 DIAGNOSIS — O22.03: ICD-10-CM

## 2024-07-01 PROBLEM — O09.93 SUPERVISION OF HIGH RISK PREGNANCY IN THIRD TRIMESTER (HHS-HCC): Status: ACTIVE | Noted: 2024-07-01

## 2024-07-01 PROCEDURE — 76816 OB US FOLLOW-UP PER FETUS: CPT

## 2024-07-01 PROCEDURE — 76817 TRANSVAGINAL US OBSTETRIC: CPT

## 2024-07-01 PROCEDURE — 0501F PRENATAL FLOW SHEET: CPT

## 2024-07-01 NOTE — PROGRESS NOTES
I saw and evaluated the patient. I personally obtained the key and critical portions of the history and physical exam or was physically present for key and critical portions performed by the resident/fellow. I reviewed the resident/fellow's documentation and discussed the patient with the resident/fellow. I agree with the resident/fellow's medical decision making as documented in the note.    Velma Crowell MD

## 2024-07-01 NOTE — ASSESSMENT & PLAN NOTE
Currently low-lying placenta.   Most recent scan while inpatient difficult to delineate placental location due to fetal position.  Has follow up ultrasound today to evaluate low-lying, follow up results for delivery planning.

## 2024-07-01 NOTE — PROGRESS NOTES
OB Follow-up  24     SUBJECTIVE    HPI: Norah Khan is a 38 y.o.  at 33w4d here for RPNV.   Denies contractions, bleeding, or LOF. Reports normal fetal movement. Patient reports no concerns today.    Previously admitted to the antepartum service for large volume vaginal bleeding in the setting of low lying placenta (previously placenta previa on anatomy US). Most recent TVUS evaluation with placental location 13mm away from internal os, which is a large change from previous evaluation which was 2mm.     Currently undergoing workup for anterior vaginal varicosity with pelvic MRI awaiting to be scheduled. Asymptomatic from that standpoint.      Problem List Items Addressed This Visit       Placenta previa in third trimester ([facility])     Currently low-lying placenta.   Most recent scan while inpatient difficult to delineate placental location due to fetal position.  Has follow up ultrasound today to evaluate low-lying, follow up results for delivery planning.         Obstetric varicose veins, third trimester ([facility])    Vaginal bleeding in pregnancy ([facility])     Denies further bleeding since hospital admission.          Other Visit Diagnoses       33 weeks gestation of pregnancy ([facility])    -  Primary             OBJECTIVE  Visit Vitals  /78 Comment: 80   Wt 80.2 kg (176 lb 14.4 oz)   LMP 2023 (Exact Date)   BMI 29.44 kg/m²   OB Status Pregnant   Smoking Status Never   BSA 1.92 m²      FHT: 130    ASSESSMENT & PLAN  Norah Khan is a 38 y.o.  at 33w4d here for the following concerns we addressed today:    Vaginal bleeding in pregnancy ([facility])  Denies further bleeding since hospital admission.    Placenta previa in third trimester ([facility])  Currently low-lying placenta.   Most recent scan while inpatient difficult to delineate placental location due to fetal position.  Has follow up ultrasound today to evaluate low-lying, follow up results for delivery planning.      Pregnancy care up to date.  RTC in 1 week for FRIEDA and  testing.    Patient seen and evaluated with Dr. Crowell.    Sri Rivas MD   PGY-2, Obstetrics and Gynecology

## 2024-07-05 ENCOUNTER — TELEPHONE (OUTPATIENT)
Dept: OBSTETRICS AND GYNECOLOGY | Facility: CLINIC | Age: 39
End: 2024-07-05
Payer: COMMERCIAL

## 2024-07-05 NOTE — TELEPHONE ENCOUNTER
Call to Radiology regarding MRI scheduling. Instructed to have pt call [phone] schedule MRI then pre-certification will begin. Usually takes about 7-10 days. Pt notified and agrees to schedule.

## 2024-07-08 ENCOUNTER — PROCEDURE VISIT (OUTPATIENT)
Dept: OBSTETRICS AND GYNECOLOGY | Facility: CLINIC | Age: 39
End: 2024-07-08
Payer: COMMERCIAL

## 2024-07-08 ENCOUNTER — ROUTINE PRENATAL (OUTPATIENT)
Dept: OBSTETRICS AND GYNECOLOGY | Facility: CLINIC | Age: 39
End: 2024-07-08
Payer: COMMERCIAL

## 2024-07-08 VITALS
SYSTOLIC BLOOD PRESSURE: 115 MMHG | BODY MASS INDEX: 29.3 KG/M2 | HEART RATE: 99 BPM | DIASTOLIC BLOOD PRESSURE: 76 MMHG | WEIGHT: 176.06 LBS

## 2024-07-08 DIAGNOSIS — O46.90 VAGINAL BLEEDING IN PREGNANCY (HHS-HCC): ICD-10-CM

## 2024-07-08 DIAGNOSIS — O44.40 LOW LYING PLACENTA, ANTEPARTUM (HHS-HCC): ICD-10-CM

## 2024-07-08 DIAGNOSIS — O44.03 PLACENTA PREVIA IN THIRD TRIMESTER (HHS-HCC): ICD-10-CM

## 2024-07-08 DIAGNOSIS — Z87.59 HISTORY OF POSTPARTUM HYPERTENSION: ICD-10-CM

## 2024-07-08 DIAGNOSIS — O09.523 MULTIGRAVIDA OF ADVANCED MATERNAL AGE IN THIRD TRIMESTER (HHS-HCC): ICD-10-CM

## 2024-07-08 DIAGNOSIS — F41.1 GENERALIZED ANXIETY DISORDER: ICD-10-CM

## 2024-07-08 DIAGNOSIS — Z86.79 HISTORY OF POSTPARTUM HYPERTENSION: ICD-10-CM

## 2024-07-08 DIAGNOSIS — Z34.93 THIRD TRIMESTER PREGNANCY (HHS-HCC): Primary | ICD-10-CM

## 2024-07-08 DIAGNOSIS — O22.03: ICD-10-CM

## 2024-07-08 ASSESSMENT — PAIN SCALES - GENERAL: PAINLEVEL_OUTOF10: 0 - NO PAIN

## 2024-07-08 ASSESSMENT — PAIN - FUNCTIONAL ASSESSMENT: PAIN_FUNCTIONAL_ASSESSMENT: 0-10

## 2024-07-08 NOTE — ASSESSMENT & PLAN NOTE
- Please see pregnancy episode checklist  - Up to date on PNC  - Desires 39 wk IOL, will schedule when closer to that date  - Discussed with MFM attending today whether to continue NST. Dsicussed with patient. Through shared decision making decided to not complete NST today.

## 2024-07-08 NOTE — ASSESSMENT & PLAN NOTE
VB, s/p antepartum admission 6/16-6/18, placenta previously low-lying. --> US 7/1 placenta no longer low lying. Anterior.

## 2024-07-08 NOTE — PROGRESS NOTES
"Subjective   Patient ID [id number]   Norah Khan is a 38 y.o.  at 34w4d with a working estimated date of delivery of 8/15/2024, by Last Menstrual Period who presents for a routine prenatal visit. She denies vaginal bleeding, leakage of fluid, decreased fetal movements, or contractions.    Her pregnancy is complicated by:  - VB, s/p antepartum admission -, placenta previously low-lying. --> US  placenta no longer low lying. Anterior. No VB since discharge from hospital.  - Anterior vaginal varicosity: MRI scheduled for next Saturday. No new pain or complaints related to this.    S/p 2nd tri labs (wnl), tdap 6/10/24    Objective   Physical Exam:      Expected Total Weight Gain: 7 kg (15 lb)-11.5 kg (25 lb)   Pregravid BMI: 25.13     Fetal Heart Rate: 128 Fundal Height (cm): 33 cm             Prenatal Labs  Urine Dip:  Lab Results   Component Value Date    KETONESU Negative 2024    GLUCOSEUR NEGATIVE 2024    LEUKOCYTESUR (1+) Rare 2024     Lab Results   Component Value Date    HGB 11.6 (L) 2024    HCT 33.9 (L) 2024    ABO A 2024    HEPBSAG Nonreactive 2024     No results found for: \"PAPPA\", \"AFP\", \"HCG\", \"ESTRIOL\", \"INHBA\"  No results found for: \"GLUF\", \"GLUT1\", \"BNBCJBS7IR\", \"CROJEUX5HM\"      Assessment/Plan   Problem List Items Addressed This Visit             ICD-10-CM    Generalized anxiety disorder F41.1    History of postpartum hypertension Z86.79, Z87.59     - normotensive today         Low lying placenta, antepartum ([facility]) O44.40     VB, s/p antepartum admission -, placenta previously low-lying. --> US  placenta no longer low lying. Anterior.         Multigravida of advanced maternal age in third trimester ([facility]) O09.523    Obstetric varicose veins, third trimester ([facility]) O22.03     - previously was recommended to have MRI, scheduled for          Third trimester pregnancy ([facility]) - Primary Z34.93     - Please see pregnancy " episode checklist  - Up to date on PNC  - Desires 39 wk IOL, will schedule when closer to that date  - Discussed with MFM attending today whether to continue NST. Dsicussed with patient. Through shared decision making decided to not complete NST today.         Vaginal bleeding in pregnancy ([facility]) O46.90     - now resolved, likely due to previously low lying placenta          RTC in 2 wks (already scheduled)    Pt seen and d/w Dr. Ofelia Bobby MD PGY-3

## 2024-07-12 ENCOUNTER — APPOINTMENT (OUTPATIENT)
Dept: OBSTETRICS AND GYNECOLOGY | Facility: CLINIC | Age: 39
End: 2024-07-12
Payer: COMMERCIAL

## 2024-07-15 NOTE — PROGRESS NOTES
I saw and evaluated the patient. I personally obtained the key and critical portions of the history and physical exam or was physically present for key and critical portions performed by the resident/fellow. I reviewed the resident/fellow's documentation and discussed the patient with the resident/fellow. I agree with the resident/fellow's medical decision making as documented in the note.    Alyssa Gilbert MD

## 2024-07-18 ENCOUNTER — APPOINTMENT (OUTPATIENT)
Dept: RADIOLOGY | Facility: CLINIC | Age: 39
End: 2024-07-18
Payer: COMMERCIAL

## 2024-07-19 ENCOUNTER — DOCUMENTATION (OUTPATIENT)
Dept: OBSTETRICS AND GYNECOLOGY | Facility: CLINIC | Age: 39
End: 2024-07-19
Payer: COMMERCIAL

## 2024-07-19 ENCOUNTER — APPOINTMENT (OUTPATIENT)
Dept: OBSTETRICS AND GYNECOLOGY | Facility: HOSPITAL | Age: 39
End: 2024-07-19
Payer: COMMERCIAL

## 2024-07-19 ENCOUNTER — HOSPITAL ENCOUNTER (OUTPATIENT)
Dept: RADIOLOGY | Facility: CLINIC | Age: 39
Discharge: HOME | End: 2024-07-19
Payer: COMMERCIAL

## 2024-07-19 ENCOUNTER — ROUTINE PRENATAL (OUTPATIENT)
Dept: OBSTETRICS AND GYNECOLOGY | Facility: CLINIC | Age: 39
End: 2024-07-19
Payer: COMMERCIAL

## 2024-07-19 VITALS — BODY MASS INDEX: 29.2 KG/M2 | SYSTOLIC BLOOD PRESSURE: 118 MMHG | DIASTOLIC BLOOD PRESSURE: 77 MMHG | WEIGHT: 175.5 LBS

## 2024-07-19 DIAGNOSIS — F41.1 GENERALIZED ANXIETY DISORDER: ICD-10-CM

## 2024-07-19 DIAGNOSIS — Z3A.09 9 WEEKS GESTATION OF PREGNANCY (HHS-HCC): ICD-10-CM

## 2024-07-19 DIAGNOSIS — Z86.79 HISTORY OF POSTPARTUM HYPERTENSION: ICD-10-CM

## 2024-07-19 DIAGNOSIS — O22.03: Primary | ICD-10-CM

## 2024-07-19 DIAGNOSIS — O46.90 VAGINAL BLEEDING IN PREGNANCY (HHS-HCC): ICD-10-CM

## 2024-07-19 DIAGNOSIS — O09.521 AMA (ADVANCED MATERNAL AGE) MULTIGRAVIDA 35+, FIRST TRIMESTER (HHS-HCC): ICD-10-CM

## 2024-07-19 DIAGNOSIS — Z34.93 THIRD TRIMESTER PREGNANCY (HHS-HCC): ICD-10-CM

## 2024-07-19 DIAGNOSIS — O44.40 LOW LYING PLACENTA, ANTEPARTUM (HHS-HCC): ICD-10-CM

## 2024-07-19 DIAGNOSIS — Z87.59 HISTORY OF POSTPARTUM HYPERTENSION: ICD-10-CM

## 2024-07-19 DIAGNOSIS — O09.521 MULTIGRAVIDA OF ADVANCED MATERNAL AGE IN FIRST TRIMESTER (HHS-HCC): ICD-10-CM

## 2024-07-19 DIAGNOSIS — O09.523 MULTIGRAVIDA OF ADVANCED MATERNAL AGE IN THIRD TRIMESTER (HHS-HCC): ICD-10-CM

## 2024-07-19 DIAGNOSIS — Z3A.36 36 WEEKS GESTATION OF PREGNANCY (HHS-HCC): ICD-10-CM

## 2024-07-19 PROCEDURE — 87081 CULTURE SCREEN ONLY: CPT | Performed by: OBSTETRICS & GYNECOLOGY

## 2024-07-19 PROCEDURE — 76816 OB US FOLLOW-UP PER FETUS: CPT

## 2024-07-19 PROCEDURE — 0501F PRENATAL FLOW SHEET: CPT | Performed by: OBSTETRICS & GYNECOLOGY

## 2024-07-19 PROCEDURE — 76819 FETAL BIOPHYS PROFIL W/O NST: CPT

## 2024-07-19 NOTE — PROGRESS NOTES
Had ultrasound today  GBS collected  Has MRI scheduled for 7/24/24 to assess varicosities    Problem List Items Addressed This Visit          Pregnancy    Generalized anxiety disorder    History of postpartum hypertension    Overview     Takes bASA 162 mg         Low lying placenta, antepartum ([facility])    Overview     Diagnosed @ 19+ wks 3/21/24 (anatomy scan)  Anterior placenta  No history of prior uterine surgery  Had spotting prior to diagnosis of placenta previa at time of anatomy scan  Previa resolved @ 29 wks scan, low lying (2 mm away)  No longer low-lying @ 33+ wks         Multigravida of advanced maternal age in third trimester ([facility])    Obstetric varicose veins, third trimester ([facility]) - Primary    Overview     Pelvic MRI on 7/24/24 to assess anatomy         Third trimester pregnancy ([facility])    Vaginal bleeding in pregnancy ([facility])    Overview     - admitted 6/16-18 for large volume bleeding at home, bleeding scant in hospital with normal labs  - placenta low-lying (~1.3mm from os, limited measurements on TVUS 6/17). Previa resolved.           Other Visit Diagnoses       36 weeks gestation of pregnancy ([facility])        Relevant Orders    Group B Streptococcus (GBS) Prenatal Screen, Culture (Completed)

## 2024-07-20 ENCOUNTER — HOSPITAL ENCOUNTER (OUTPATIENT)
Dept: RADIOLOGY | Facility: HOSPITAL | Age: 39
End: 2024-07-20
Payer: COMMERCIAL

## 2024-07-21 LAB — GP B STREP GENITAL QL CULT: NORMAL

## 2024-07-23 LAB — GP B STREP GENITAL QL CULT: NORMAL

## 2024-07-24 ENCOUNTER — APPOINTMENT (OUTPATIENT)
Dept: OBSTETRICS AND GYNECOLOGY | Facility: HOSPITAL | Age: 39
End: 2024-07-24
Payer: COMMERCIAL

## 2024-07-24 ENCOUNTER — ROUTINE PRENATAL (OUTPATIENT)
Dept: OBSTETRICS AND GYNECOLOGY | Facility: HOSPITAL | Age: 39
End: 2024-07-24
Payer: COMMERCIAL

## 2024-07-24 ENCOUNTER — HOSPITAL ENCOUNTER (OUTPATIENT)
Dept: RADIOLOGY | Facility: HOSPITAL | Age: 39
Discharge: HOME | End: 2024-07-24
Payer: COMMERCIAL

## 2024-07-24 VITALS — BODY MASS INDEX: 29.45 KG/M2 | SYSTOLIC BLOOD PRESSURE: 116 MMHG | DIASTOLIC BLOOD PRESSURE: 77 MMHG | WEIGHT: 177 LBS

## 2024-07-24 DIAGNOSIS — Z87.59 HISTORY OF POSTPARTUM HYPERTENSION: ICD-10-CM

## 2024-07-24 DIAGNOSIS — O46.90 VAGINAL BLEEDING IN PREGNANCY (HHS-HCC): ICD-10-CM

## 2024-07-24 DIAGNOSIS — O22.03: ICD-10-CM

## 2024-07-24 DIAGNOSIS — Z3A.36 36 WEEKS GESTATION OF PREGNANCY (HHS-HCC): ICD-10-CM

## 2024-07-24 DIAGNOSIS — Z86.79 HISTORY OF POSTPARTUM HYPERTENSION: ICD-10-CM

## 2024-07-24 DIAGNOSIS — O22.03: Primary | ICD-10-CM

## 2024-07-24 DIAGNOSIS — F41.1 GENERALIZED ANXIETY DISORDER: ICD-10-CM

## 2024-07-24 DIAGNOSIS — Z34.93 THIRD TRIMESTER PREGNANCY (HHS-HCC): ICD-10-CM

## 2024-07-24 DIAGNOSIS — O09.523 MULTIGRAVIDA OF ADVANCED MATERNAL AGE IN THIRD TRIMESTER (HHS-HCC): ICD-10-CM

## 2024-07-24 PROCEDURE — 72195 MRI PELVIS W/O DYE: CPT

## 2024-07-24 PROCEDURE — 0501F PRENATAL FLOW SHEET: CPT | Performed by: OBSTETRICS & GYNECOLOGY

## 2024-07-24 PROCEDURE — 72195 MRI PELVIS W/O DYE: CPT | Performed by: RADIOLOGY

## 2024-07-24 ASSESSMENT — ENCOUNTER SYMPTOMS
LOSS OF SENSATION IN FEET: 0
DEPRESSION: 0
OCCASIONAL FEELINGS OF UNSTEADINESS: 0

## 2024-07-24 NOTE — PROGRESS NOTES
Has MRI pelvis later today.  No complaints.  Scheduled for IOL @ 39+ wks (8/9/24).      Problem List Items Addressed This Visit          Pregnancy    Generalized anxiety disorder    History of postpartum hypertension    Overview     Takes bASA 162 mg         Low lying placenta, antepartum ([facility])    Overview     Diagnosed @ 19+ wks 3/21/24 (anatomy scan)  Anterior placenta  No history of prior uterine surgery  Had spotting prior to diagnosis of placenta previa at time of anatomy scan  Previa resolved @ 29 wks scan, low lying (2 mm away)  No longer low-lying @ 33+ wks         Multigravida of advanced maternal age in third trimester ([facility])    Obstetric varicose veins, third trimester ([facility])    Overview     Pelvic MRI on 7/24/24 to assess anatomy         Relevant Orders    Labor Induction    Third trimester pregnancy ([facility])    Vaginal bleeding in pregnancy ([facility])    Overview     - admitted 6/16-18 for large volume bleeding at home, bleeding scant in hospital with normal labs  - placenta low-lying (~1.3mm from os, limited measurements on TVUS 6/17). Previa resolved.

## 2024-07-25 ENCOUNTER — APPOINTMENT (OUTPATIENT)
Dept: OBSTETRICS AND GYNECOLOGY | Facility: CLINIC | Age: 39
End: 2024-07-25
Payer: COMMERCIAL

## 2024-08-01 ENCOUNTER — HOSPITAL ENCOUNTER (INPATIENT)
Facility: HOSPITAL | Age: 39
LOS: 3 days | Discharge: HOME | End: 2024-08-04
Attending: OBSTETRICS & GYNECOLOGY | Admitting: OBSTETRICS & GYNECOLOGY
Payer: COMMERCIAL

## 2024-08-01 ENCOUNTER — ANESTHESIA EVENT (OUTPATIENT)
Dept: OBSTETRICS AND GYNECOLOGY | Facility: HOSPITAL | Age: 39
End: 2024-08-01
Payer: COMMERCIAL

## 2024-08-01 ENCOUNTER — ANESTHESIA (OUTPATIENT)
Dept: OBSTETRICS AND GYNECOLOGY | Facility: HOSPITAL | Age: 39
End: 2024-08-01
Payer: COMMERCIAL

## 2024-08-01 LAB
ABO GROUP (TYPE) IN BLOOD: NORMAL
ANTIBODY SCREEN: NORMAL
APTT PPP: 25 SECONDS (ref 27–38)
BASE EXCESS BLDCOA CALC-SCNC: -3.9 MMOL/L (ref -10.8–-0.5)
BASE EXCESS BLDCOV CALC-SCNC: -4.8 MMOL/L (ref -8.1–-0.5)
BODY TEMPERATURE: 37 DEGREES CELSIUS
BODY TEMPERATURE: 37 DEGREES CELSIUS
ERYTHROCYTE [DISTWIDTH] IN BLOOD BY AUTOMATED COUNT: 14 % (ref 11.5–14.5)
FIBRINOGEN PPP-MCNC: 563 MG/DL (ref 200–400)
HCO3 BLDCOA-SCNC: 25.4 MMOL/L (ref 15–29)
HCO3 BLDCOV-SCNC: 20.5 MMOL/L (ref 16–26)
HCT VFR BLD AUTO: 37.9 % (ref 36–46)
HGB BLD-MCNC: 12.9 G/DL (ref 12–16)
INHALED O2 CONCENTRATION: 21 %
INHALED O2 CONCENTRATION: 21 %
INR PPP: 1 (ref 0.9–1.1)
MCH RBC QN AUTO: 29.9 PG (ref 26–34)
MCHC RBC AUTO-ENTMCNC: 34 G/DL (ref 32–36)
MCV RBC AUTO: 88 FL (ref 80–100)
NRBC BLD-RTO: 0 /100 WBCS (ref 0–0)
OXYHGB MFR BLDCOA: 24.8 % (ref 94–98)
OXYHGB MFR BLDCOV: 63.5 % (ref 94–98)
PCO2 BLDCOA: 65 MM HG (ref 31–75)
PCO2 BLDCOV: 38 MM HG (ref 22–53)
PH BLDCOA: 7.2 PH (ref 7.08–7.39)
PH BLDCOV: 7.34 PH (ref 7.19–7.47)
PLATELET # BLD AUTO: 161 X10*3/UL (ref 150–450)
PO2 BLDCOA: 18 MM HG (ref 5–31)
PO2 BLDCOV: 35 MM HG (ref 13–37)
PROTHROMBIN TIME: 11.1 SECONDS (ref 9.8–12.8)
RBC # BLD AUTO: 4.31 X10*6/UL (ref 4–5.2)
RH FACTOR (ANTIGEN D): NORMAL
SAO2 % BLDCOA: 25 % (ref 3–69)
SAO2 % BLDCOV: 65 % (ref 16–84)
TREPONEMA PALLIDUM IGG+IGM AB [PRESENCE] IN SERUM OR PLASMA BY IMMUNOASSAY: NONREACTIVE
WBC # BLD AUTO: 8.2 X10*3/UL (ref 4.4–11.3)

## 2024-08-01 PROCEDURE — 2500000004 HC RX 250 GENERAL PHARMACY W/ HCPCS (ALT 636 FOR OP/ED)

## 2024-08-01 PROCEDURE — 10D17ZZ EXTRACTION OF PRODUCTS OF CONCEPTION, RETAINED, VIA NATURAL OR ARTIFICIAL OPENING: ICD-10-PCS | Performed by: SPECIALIST

## 2024-08-01 PROCEDURE — 1120000001 HC OB PRIVATE ROOM DAILY

## 2024-08-01 PROCEDURE — 7100000016 HC LABOR RECOVERY PER HOUR: Performed by: SPECIALIST

## 2024-08-01 PROCEDURE — 3E033VJ INTRODUCTION OF OTHER HORMONE INTO PERIPHERAL VEIN, PERCUTANEOUS APPROACH: ICD-10-PCS

## 2024-08-01 PROCEDURE — 36415 COLL VENOUS BLD VENIPUNCTURE: CPT

## 2024-08-01 PROCEDURE — 2500000004 HC RX 250 GENERAL PHARMACY W/ HCPCS (ALT 636 FOR OP/ED): Performed by: STUDENT IN AN ORGANIZED HEALTH CARE EDUCATION/TRAINING PROGRAM

## 2024-08-01 PROCEDURE — 82805 BLOOD GASES W/O2 SATURATION: CPT

## 2024-08-01 PROCEDURE — 51701 INSERT BLADDER CATHETER: CPT

## 2024-08-01 PROCEDURE — 2500000005 HC RX 250 GENERAL PHARMACY W/O HCPCS: Performed by: STUDENT IN AN ORGANIZED HEALTH CARE EDUCATION/TRAINING PROGRAM

## 2024-08-01 PROCEDURE — 86780 TREPONEMA PALLIDUM: CPT

## 2024-08-01 PROCEDURE — 2500000004 HC RX 250 GENERAL PHARMACY W/ HCPCS (ALT 636 FOR OP/ED): Mod: JZ | Performed by: STUDENT IN AN ORGANIZED HEALTH CARE EDUCATION/TRAINING PROGRAM

## 2024-08-01 PROCEDURE — 0W3R7ZZ CONTROL BLEEDING IN GENITOURINARY TRACT, VIA NATURAL OR ARTIFICIAL OPENING: ICD-10-PCS | Performed by: SPECIALIST

## 2024-08-01 PROCEDURE — 99214 OFFICE O/P EST MOD 30 MIN: CPT | Mod: 25

## 2024-08-01 PROCEDURE — 86901 BLOOD TYPING SEROLOGIC RH(D): CPT

## 2024-08-01 PROCEDURE — 86923 COMPATIBILITY TEST ELECTRIC: CPT

## 2024-08-01 PROCEDURE — 85610 PROTHROMBIN TIME: CPT

## 2024-08-01 PROCEDURE — 99199 UNLISTED SPECIAL SVC PX/RPRT: CPT

## 2024-08-01 PROCEDURE — 3700000014 HC AN EPIDURAL BLOCK CHARGE: Performed by: SPECIALIST

## 2024-08-01 PROCEDURE — 36430 TRANSFUSION BLD/BLD COMPNT: CPT | Mod: GC | Performed by: STUDENT IN AN ORGANIZED HEALTH CARE EDUCATION/TRAINING PROGRAM

## 2024-08-01 PROCEDURE — 7210000002 HC LABOR PER HOUR: Performed by: OBSTETRICS & GYNECOLOGY

## 2024-08-01 PROCEDURE — 59050 FETAL MONITOR W/REPORT: CPT

## 2024-08-01 PROCEDURE — 59410 OBSTETRICAL CARE: CPT

## 2024-08-01 PROCEDURE — 59409 OBSTETRICAL CARE: CPT | Performed by: SPECIALIST

## 2024-08-01 PROCEDURE — 10907ZC DRAINAGE OF AMNIOTIC FLUID, THERAPEUTIC FROM PRODUCTS OF CONCEPTION, VIA NATURAL OR ARTIFICIAL OPENING: ICD-10-PCS

## 2024-08-01 PROCEDURE — 3E0H7GC INTRODUCTION OF OTHER THERAPEUTIC SUBSTANCE INTO LOWER GI, VIA NATURAL OR ARTIFICIAL OPENING: ICD-10-PCS

## 2024-08-01 PROCEDURE — 2500000002 HC RX 250 W HCPCS SELF ADMINISTERED DRUGS (ALT 637 FOR MEDICARE OP, ALT 636 FOR OP/ED)

## 2024-08-01 PROCEDURE — 59812 TREATMENT OF MISCARRIAGE: CPT | Performed by: SPECIALIST

## 2024-08-01 PROCEDURE — 99222 1ST HOSP IP/OBS MODERATE 55: CPT

## 2024-08-01 PROCEDURE — 3700000014 EPIDURAL BLOCK: Mod: GC | Performed by: STUDENT IN AN ORGANIZED HEALTH CARE EDUCATION/TRAINING PROGRAM

## 2024-08-01 PROCEDURE — 7210000002 HC LABOR PER HOUR

## 2024-08-01 PROCEDURE — 85027 COMPLETE CBC AUTOMATED: CPT

## 2024-08-01 PROCEDURE — 85384 FIBRINOGEN ACTIVITY: CPT

## 2024-08-01 RX ORDER — TERBUTALINE SULFATE 1 MG/ML
0.25 INJECTION SUBCUTANEOUS ONCE AS NEEDED
Status: DISCONTINUED | OUTPATIENT
Start: 2024-08-01 | End: 2024-08-02 | Stop reason: HOSPADM

## 2024-08-01 RX ORDER — FENTANYL/BUPIVACAINE/NS/PF 2MCG/ML-.1
PLASTIC BAG, INJECTION (ML) INJECTION AS NEEDED
Status: DISCONTINUED | OUTPATIENT
Start: 2024-08-01 | End: 2024-08-01

## 2024-08-01 RX ORDER — OXYTOCIN/0.9 % SODIUM CHLORIDE 30/500 ML
60 PLASTIC BAG, INJECTION (ML) INTRAVENOUS ONCE AS NEEDED
Status: DISCONTINUED | OUTPATIENT
Start: 2024-08-01 | End: 2024-08-02 | Stop reason: HOSPADM

## 2024-08-01 RX ORDER — HYDRALAZINE HYDROCHLORIDE 20 MG/ML
5 INJECTION INTRAMUSCULAR; INTRAVENOUS ONCE AS NEEDED
Status: DISCONTINUED | OUTPATIENT
Start: 2024-08-01 | End: 2024-08-02 | Stop reason: HOSPADM

## 2024-08-01 RX ORDER — CARBOPROST TROMETHAMINE 250 UG/ML
250 INJECTION, SOLUTION INTRAMUSCULAR ONCE AS NEEDED
Status: DISCONTINUED | OUTPATIENT
Start: 2024-08-01 | End: 2024-08-02 | Stop reason: HOSPADM

## 2024-08-01 RX ORDER — METOCLOPRAMIDE 10 MG/1
10 TABLET ORAL EVERY 6 HOURS PRN
Status: DISCONTINUED | OUTPATIENT
Start: 2024-08-01 | End: 2024-08-02

## 2024-08-01 RX ORDER — TRANEXAMIC ACID 100 MG/ML
1000 INJECTION, SOLUTION INTRAVENOUS ONCE AS NEEDED
Status: DISCONTINUED | OUTPATIENT
Start: 2024-08-01 | End: 2024-08-02 | Stop reason: HOSPADM

## 2024-08-01 RX ORDER — LABETALOL HYDROCHLORIDE 5 MG/ML
20 INJECTION, SOLUTION INTRAVENOUS ONCE AS NEEDED
Status: DISCONTINUED | OUTPATIENT
Start: 2024-08-01 | End: 2024-08-02 | Stop reason: HOSPADM

## 2024-08-01 RX ORDER — ONDANSETRON HYDROCHLORIDE 2 MG/ML
4 INJECTION, SOLUTION INTRAVENOUS EVERY 6 HOURS PRN
Status: DISCONTINUED | OUTPATIENT
Start: 2024-08-01 | End: 2024-08-02

## 2024-08-01 RX ORDER — METOCLOPRAMIDE HYDROCHLORIDE 5 MG/ML
10 INJECTION INTRAMUSCULAR; INTRAVENOUS EVERY 6 HOURS PRN
Status: DISCONTINUED | OUTPATIENT
Start: 2024-08-01 | End: 2024-08-02

## 2024-08-01 RX ORDER — OXYTOCIN 10 [USP'U]/ML
10 INJECTION, SOLUTION INTRAMUSCULAR; INTRAVENOUS ONCE AS NEEDED
Status: DISCONTINUED | OUTPATIENT
Start: 2024-08-01 | End: 2024-08-02 | Stop reason: HOSPADM

## 2024-08-01 RX ORDER — DIPHENHYDRAMINE HYDROCHLORIDE 50 MG/ML
25 INJECTION INTRAMUSCULAR; INTRAVENOUS EVERY 6 HOURS PRN
Status: DISCONTINUED | OUTPATIENT
Start: 2024-08-01 | End: 2024-08-02

## 2024-08-01 RX ORDER — PHENYLEPHRINE HCL IN 0.9% NACL 0.4MG/10ML
SYRINGE (ML) INTRAVENOUS AS NEEDED
Status: DISCONTINUED | OUTPATIENT
Start: 2024-08-01 | End: 2024-08-01

## 2024-08-01 RX ORDER — ONDANSETRON 4 MG/1
4 TABLET, FILM COATED ORAL EVERY 6 HOURS PRN
Status: DISCONTINUED | OUTPATIENT
Start: 2024-08-01 | End: 2024-08-02

## 2024-08-01 RX ORDER — LOPERAMIDE HYDROCHLORIDE 2 MG/1
4 CAPSULE ORAL EVERY 2 HOUR PRN
Status: DISCONTINUED | OUTPATIENT
Start: 2024-08-01 | End: 2024-08-02 | Stop reason: HOSPADM

## 2024-08-01 RX ORDER — NIFEDIPINE 10 MG/1
10 CAPSULE ORAL ONCE AS NEEDED
Status: DISCONTINUED | OUTPATIENT
Start: 2024-08-01 | End: 2024-08-02 | Stop reason: HOSPADM

## 2024-08-01 RX ORDER — SODIUM CHLORIDE, SODIUM LACTATE, POTASSIUM CHLORIDE, CALCIUM CHLORIDE 600; 310; 30; 20 MG/100ML; MG/100ML; MG/100ML; MG/100ML
125 INJECTION, SOLUTION INTRAVENOUS CONTINUOUS
Status: DISCONTINUED | OUTPATIENT
Start: 2024-08-01 | End: 2024-08-02

## 2024-08-01 RX ORDER — OXYTOCIN/0.9 % SODIUM CHLORIDE 30/500 ML
2-30 PLASTIC BAG, INJECTION (ML) INTRAVENOUS CONTINUOUS
Status: DISCONTINUED | OUTPATIENT
Start: 2024-08-01 | End: 2024-08-02

## 2024-08-01 RX ORDER — LIDOCAINE HYDROCHLORIDE 10 MG/ML
30 INJECTION INFILTRATION; PERINEURAL ONCE AS NEEDED
Status: DISCONTINUED | OUTPATIENT
Start: 2024-08-01 | End: 2024-08-02 | Stop reason: HOSPADM

## 2024-08-01 RX ORDER — LIDOCAINE HYDROCHLORIDE AND EPINEPHRINE 15; 5 MG/ML; UG/ML
INJECTION, SOLUTION EPIDURAL AS NEEDED
Status: DISCONTINUED | OUTPATIENT
Start: 2024-08-01 | End: 2024-08-01

## 2024-08-01 RX ORDER — METHYLERGONOVINE MALEATE 0.2 MG/ML
0.2 INJECTION INTRAVENOUS ONCE AS NEEDED
Status: COMPLETED | OUTPATIENT
Start: 2024-08-01 | End: 2024-08-01

## 2024-08-01 RX ORDER — MISOPROSTOL 200 UG/1
800 TABLET ORAL ONCE AS NEEDED
Status: COMPLETED | OUTPATIENT
Start: 2024-08-01 | End: 2024-08-01

## 2024-08-01 RX ORDER — FENTANYL/BUPIVACAINE/NS/PF 2MCG/ML-.1
0-54 PLASTIC BAG, INJECTION (ML) INJECTION CONTINUOUS
Status: DISCONTINUED | OUTPATIENT
Start: 2024-08-01 | End: 2024-08-02

## 2024-08-01 SDOH — SOCIAL STABILITY: SOCIAL INSECURITY: DO YOU FEEL ANYONE HAS EXPLOITED OR TAKEN ADVANTAGE OF YOU FINANCIALLY OR OF YOUR PERSONAL PROPERTY?: NO

## 2024-08-01 SDOH — SOCIAL STABILITY: SOCIAL INSECURITY: HAVE YOU HAD ANY THOUGHTS OF HARMING ANYONE ELSE?: NO

## 2024-08-01 SDOH — SOCIAL STABILITY: SOCIAL INSECURITY: VERBAL ABUSE: DENIES

## 2024-08-01 SDOH — HEALTH STABILITY: MENTAL HEALTH: HAVE YOU USED ANY PRESCRIPTION DRUGS OTHER THAN PRESCRIBED IN THE PAST 12 MONTHS?: NO

## 2024-08-01 SDOH — SOCIAL STABILITY: SOCIAL INSECURITY: ARE YOU OR HAVE YOU BEEN THREATENED OR ABUSED PHYSICALLY, EMOTIONALLY, OR SEXUALLY BY ANYONE?: NO

## 2024-08-01 SDOH — SOCIAL STABILITY: SOCIAL INSECURITY: HAS ANYONE EVER THREATENED TO HURT YOUR FAMILY OR YOUR PETS?: NO

## 2024-08-01 SDOH — HEALTH STABILITY: MENTAL HEALTH: NON-SPECIFIC ACTIVE SUICIDAL THOUGHTS (PAST 1 MONTH): NO

## 2024-08-01 SDOH — SOCIAL STABILITY: SOCIAL INSECURITY: HAVE YOU HAD THOUGHTS OF HARMING ANYONE ELSE?: NO

## 2024-08-01 SDOH — SOCIAL STABILITY: SOCIAL INSECURITY: PHYSICAL ABUSE: DENIES

## 2024-08-01 SDOH — SOCIAL STABILITY: SOCIAL INSECURITY: DOES ANYONE TRY TO KEEP YOU FROM HAVING/CONTACTING OTHER FRIENDS OR DOING THINGS OUTSIDE YOUR HOME?: NO

## 2024-08-01 SDOH — HEALTH STABILITY: MENTAL HEALTH: WISH TO BE DEAD (PAST 1 MONTH): NO

## 2024-08-01 SDOH — ECONOMIC STABILITY: HOUSING INSECURITY: DO YOU FEEL UNSAFE GOING BACK TO THE PLACE WHERE YOU ARE LIVING?: NO

## 2024-08-01 SDOH — HEALTH STABILITY: MENTAL HEALTH: WERE YOU ABLE TO COMPLETE ALL THE BEHAVIORAL HEALTH SCREENINGS?: YES

## 2024-08-01 SDOH — HEALTH STABILITY: MENTAL HEALTH: SUICIDAL BEHAVIOR (LIFETIME): NO

## 2024-08-01 SDOH — HEALTH STABILITY: MENTAL HEALTH: HAVE YOU USED ANY SUBSTANCES (CANABIS, COCAINE, HEROIN, HALLUCINOGENS, INHALANTS, ETC.) IN THE PAST 12 MONTHS?: NO

## 2024-08-01 SDOH — SOCIAL STABILITY: SOCIAL INSECURITY: ARE THERE ANY APPARENT SIGNS OF INJURIES/BEHAVIORS THAT COULD BE RELATED TO ABUSE/NEGLECT?: NO

## 2024-08-01 SDOH — SOCIAL STABILITY: SOCIAL INSECURITY: ABUSE SCREEN: ADULT

## 2024-08-01 ASSESSMENT — ACTIVITIES OF DAILY LIVING (ADL)
HEARING - RIGHT EAR: FUNCTIONAL
LACK_OF_TRANSPORTATION: NO
JUDGMENT_ADEQUATE_SAFELY_COMPLETE_DAILY_ACTIVITIES: NO
FEEDING YOURSELF: INDEPENDENT
WALKS IN HOME: INDEPENDENT
PATIENT'S MEMORY ADEQUATE TO SAFELY COMPLETE DAILY ACTIVITIES?: NO
DRESSING YOURSELF: INDEPENDENT
GROOMING: INDEPENDENT
BATHING: INDEPENDENT
ADEQUATE_TO_COMPLETE_ADL: NO
HEARING - LEFT EAR: FUNCTIONAL
TOILETING: INDEPENDENT

## 2024-08-01 ASSESSMENT — PAIN SCALES - GENERAL
PAINLEVEL_OUTOF10: 0 - NO PAIN
PAINLEVEL_OUTOF10: 0 - NO PAIN
PAINLEVEL_OUTOF10: 4
PAINLEVEL_OUTOF10: 0 - NO PAIN
PAINLEVEL_OUTOF10: 6
PAINLEVEL_OUTOF10: 3
PAINLEVEL_OUTOF10: 4
PAINLEVEL_OUTOF10: 0 - NO PAIN
PAINLEVEL_OUTOF10: 5 - MODERATE PAIN
PAINLEVEL_OUTOF10: 2
PAINLEVEL_OUTOF10: 0 - NO PAIN
PAINLEVEL_OUTOF10: 0 - NO PAIN

## 2024-08-01 ASSESSMENT — LIFESTYLE VARIABLES
AUDIT-C TOTAL SCORE: 0
HOW OFTEN DO YOU HAVE 6 OR MORE DRINKS ON ONE OCCASION: NEVER
SKIP TO QUESTIONS 9-10: 1
HOW MANY STANDARD DRINKS CONTAINING ALCOHOL DO YOU HAVE ON A TYPICAL DAY: PATIENT DOES NOT DRINK
AUDIT-C TOTAL SCORE: 0
HOW OFTEN DO YOU HAVE A DRINK CONTAINING ALCOHOL: NEVER

## 2024-08-01 ASSESSMENT — PATIENT HEALTH QUESTIONNAIRE - PHQ9
2. FEELING DOWN, DEPRESSED OR HOPELESS: NOT AT ALL
SUM OF ALL RESPONSES TO PHQ9 QUESTIONS 1 & 2: 0
1. LITTLE INTEREST OR PLEASURE IN DOING THINGS: NOT AT ALL

## 2024-08-01 NOTE — H&P
Obstetrical Admission History and Physical    Assessment/Plan    Norah Khan is a 38 y.o.  at 38w0d, KIMBERLY: 8/15/2024, by Last Menstrual Period c/w 12wk US, admitted for placental abruption     IOL  Admitted, consented, scanned, cephalic  Will induce with pitocin. AROM as appropriate  Epidural as requested  GBS negative  EFW 3004 g (66%), AC 88%  US --> extrapolated to 3500 g  CEFM, currently Cat I  Delivery plan: patient desires vaginal delivery, patient counseled on risks of labor, vaginal delivery, and possibility of C/S for varying maternal or fetal indications    Abruption  Prior admission for c/f abruption in . BMZ complete -  CBC with starting hgb 12.9, fibrinogen 563, coags wnl  Will place 2nd IV  Patient expressed understanding and consented to potential transfusion  Clear diet  Will type and cross for 2 units   US: no obvious placenta abnormalities suggestive of abruption. Low concern for SROM, GERARD of 13.7 on BSUS, consistent with GERARD 15 on   Discussed high risk of need for CS for hemorrhage or fetal distress. Will proceed with IOL given reassuring FHT and stable maternal status    Pregnancy notables:   Previous placenta previa @ 19 weeks, low lying placenta had resolved @ 33 wks. Placental edge 3cm from os on  MR  Vaginal varicosities, had MRI , results unremarkable  Hx of postpartum HTN --> takes bASA 162 mg    Postpartum planning:  Contraception: POPs  Feeding: breastfeeding, would like to see lactation inpatient     Patient seen and discussed with Dr. Kraft, Dr. Jacob, and Dr. Kimberly Pemberton,   OBGYN    Patient seen and evaluated with medical student. I agree with the assessment above, and have made edits within text.  Patient discussed with Dr. Kimberly Kraft, PGY-2      Subjective   She endorses vaginal bleeding starting 1 hour ago. She endorses that she bled through 1 full pad and then sat on the toilet and had continuous fluid loss  for 15 minutes. She then put on another pad and bled through this pad with large clots. She states that she had a similar bleed in  that resolved. She endorses some minimal lower pelvic cramping that she is unsure if is contractions. She endorses good fetal movement. She denies any other symptoms including HA, n/v, pain, dizziness, lightheadedness.     Pregnancy notable for:  Medical Problems       Problem List       * (Principal) Labor and delivery indication for care or intervention ([facility])    Obsessive compulsive neurosis    Generalized anxiety disorder    Multigravida of advanced maternal age in third trimester ([facility])    Low lying placenta, antepartum ([facility])    Overview Addendum 2024  1:59 PM by Kelly Regan MD     Diagnosed @ 19+ wks 3/21/24 (anatomy scan)  Anterior placenta  No history of prior uterine surgery  Had spotting prior to diagnosis of placenta previa at time of anatomy scan  Previa resolved @ 29 wks scan, low lying (2 mm away)  No longer low-lying @ 33+ wks         History of postpartum hypertension    Overview Signed 2024 11:21 AM by Kelly Regan MD     Takes bASA 162 mg         Obstetric varicose veins, third trimester ([facility])    Overview Signed 2024  1:59 PM by Kelly Regan MD     Pelvic MRI on 24 to assess anatomy         Vaginal bleeding in pregnancy ([facility])    Overview Signed 2024  8:00 AM by Raven Diallo MD     - admitted - for large volume bleeding at home, bleeding scant in hospital with normal labs  - placenta low-lying (~1.3mm from os, limited measurements on TVUS ). Previa resolved.          Third trimester pregnancy ([facility])          Obstetrical History   OB History    Para Term  AB Living   2 1 1     1   SAB IAB Ectopic Multiple Live Births           1      # Outcome Date GA Lbr Major/2nd Weight Sex Type Anes PTL Lv   2 Current            1 Term 22 40w0d  3.374 kg F Vag-Spont EPI  PAUL   - Prior pregnancy: 2  years ago, IOL and  with small laceration repair    Past Medical History  Past Medical History:   Diagnosis Date    Depression     Encounter for full-term uncomplicated delivery ([facility]) 06/15/2022     (spontaneous vaginal delivery)    Encounter for immunization 2021    COVID-19 vaccine administered    Encounter for initial prescription of contraceptive pills 2020    Encounter for initial prescription of contraceptive pills    Migraine     Personal history of other diseases of the respiratory system 2020    History of acute pharyngitis    Urinary tract infection     Varicella         Past Surgical History   Past Surgical History:   Procedure Laterality Date    OTHER SURGICAL HISTORY  2021    [place] tooth extraction       Social History  Social History     Tobacco Use    Smoking status: Never     Passive exposure: Never    Smokeless tobacco: Never   Substance Use Topics    Alcohol use: Not Currently     Alcohol/week: 3.0 standard drinks of alcohol     Types: 3 Standard drinks or equivalent per week     Substance and Sexual Activity   Drug Use Never       Allergies  Suprax [cefixime]     Medications  Medications Prior to Admission   Medication Sig Dispense Refill Last Dose    aspirin 81 mg EC tablet Take 2 tablets (162 mg) by mouth once daily.   2024    FLUoxetine (PROzac) 60 mg tablet Take 1 tablet (60 mg) by mouth once daily. 90 tablet 3 2024    prenatal vit,calc78-iron-folic (Prenatabs FA) 29-1 mg tablet Take by mouth.   2024       Objective    Last Vitals  Temp Pulse Resp BP MAP O2 Sat   36.1 °C (97 °F) 98 18 121/78   99 %     Physical Examination  General: no acute distress  HEENT: normocephalic, atraumatic  Heart: warm and well perfused  Lungs: breathing comfortably on room air  Abdomen: gravid, nontender  Extremities: moving all extremities  Neuro: awake and conversant  Psych: appropriate mood and affect  SVE: 2/70/-3, speculum examination showed blood in the  vagina, lateral and posterior vaginal varicosities obscured view of the cervix   FHT: 150/mod/+accel/-decel  [place]: q3-4 min contractions  BSUS: cephalic, GERARD 13.73, no obvious placental abnormalities    Lab Review  Lab Results   Component Value Date    WBC 8.2 08/01/2024    HGB 12.9 08/01/2024    HCT 37.9 08/01/2024    MCV 88 08/01/2024     08/01/2024

## 2024-08-01 NOTE — PROGRESS NOTES
Intrapartum Progress Note    Assessment/Plan   Norah Khan is a 38 y.o.  at 38w0d, KIMBERLY: 8/15/2024, by Last Menstrual Period c/w 12wk US, admitted induction of labor in the setting of placental abruption     IOL  Awaiting epidural, pitocin per protocol, will AROM   GBS negative  EFW 3004 g (66%), AC 88%  US --> extrapolated to 3500 g  CEFM, currently Cat I  Anticipate vaginal delivery     Abruption  Prior admission for c/f abruption in . BMZ complete -  CBC with admission hgb 12.9, fibrinogen 563, coags wnl  2nd IV placed  Clear diet  T&C x2 units, QBL <100 ml so far  US: no obvious placenta abnormalities suggestive of abruption. Low concern for SROM, GERARD of 13.7 on BSUS, consistent with GERARD 15 on   Induced as above     Pregnancy notables:   Previous placenta previa @ 19 weeks, low lying placenta had resolved @ 33 wks. Placental edge 3cm from os on  MR  Vaginal varicosities, had MRI , results unremarkable  Hx of postpartum HTN --> takes bASA 162 mg     Postpartum planning:  Contraception: POPs  Feeding: breastfeeding, would like to see lactation inpatient      Patient seen and discussed with Dr. Uzma Brand MD  PGY-1, Obstetrics and Gynecology     Principal Problem:    Labor and delivery indication for care or intervention ([facility])      Subjective   Ms. Khan is doing well. Feeling more intense contractions. Denies loss of fluids. Reporting good fetal movement. Per nurse at bedside, had 5 mLs of blood on pad over 2 hours, last 2 pads, 25 ml and 16 mL of blood. Patient waiting for her epidural before an AROM.     Objective   Last Vitals:  Temp Pulse Resp BP MAP Pulse Ox   36.9 °C (98.4 °F) 96 20 116/67 84 97 %     Vitals Min/Max Last 24 Hours:  Temp  Min: 36 °C (96.8 °F)  Max: 36.9 °C (98.4 °F)  Pulse  Min: 85  Max: 98  Resp  Min: 17  Max: 20  BP  Min: 108/69  Max: 135/75  MAP (mmHg)  Min: 84  Max: 97    Intake/Output:    Intake/Output Summary (Last 24 hours) at  8/1/2024 1802  Last data filed at 8/1/2024 1800  Gross per 24 hour   Intake 834.35 ml   Output 2006 ml   Net -1171.65 ml       Physical Examination:  Constitutional: No acute distress, alert and cooperative  Head/Neck: Normocephalic,   Respiratory/Thorax: Normal respiratory effort on RA, no increased WOB, CTAB  Cardiovascular: RRR, nml S1/S2  Gastrointestinal: gravid  Musculoskeletal: grossly normal ROM  Extremities: No LE edema  Neurological: alert, oriented, no gross deficit  Psychological: Appropriate mood and behavior  Skin: warm, dry, no lesions      Cervical Exam  Dilation: 2  Effacement (%): 70  Fetal Station: -3  OB Examiner: Abena MOORE  Fetal Assessment  Movement: Present  Mode: External US  Baseline Fetal Heart Rate (bpm): 125 bpm  Baseline Classification: Normal  Variability: Moderate (Between 6 and 25 BPM)  Pattern: Accelerations  Pattern Observations: Patient off monitors to void  FHR Category: Category I      Contraction Frequency: 2-3.5        Lab Review:  Lab Results   Component Value Date    ABO A 08/01/2024    LABRH POS 08/01/2024    ABSCRN NEG 08/01/2024     Lab Results   Component Value Date    WBC 8.2 08/01/2024    HGB 12.9 08/01/2024    HCT 37.9 08/01/2024     08/01/2024     Lab Results   Component Value Date    APTT 25 (L) 08/01/2024    PROTIME 11.1 08/01/2024    INR 1.0 08/01/2024     Lab Results   Component Value Date    FIBRINOGEN 563 (H) 08/01/2024

## 2024-08-01 NOTE — ANESTHESIA PREPROCEDURE EVALUATION
Patient: Norah Khan    Evaluation Method: In-person visit    Procedure Information    Date: 08/01/24  Procedure: Labor Consult         Relevant Problems   Neuro   (+) Generalized anxiety disorder      GYN   (+) Multigravida of advanced maternal age in third trimester ([facility])       Clinical information reviewed:    Allergies  Meds               NPO Detail:  No data recorded     OB/Gyn Evaluation    Present Pregnancy    Patient is pregnant now.   Obstetric History                Physical Exam    Airway  Mallampati: I  TM distance: >3 FB  Neck ROM: full     Cardiovascular    Dental    Pulmonary    Abdominal          Anesthesia Plan    History of general anesthesia?: no  History of complications of general anesthesia?: unknown/emergency    ASA 1     epidural     Anesthetic plan and risks discussed with patient.  Use of blood products discussed with patient who consented to blood products.    Plan discussed with attending.

## 2024-08-01 NOTE — SIGNIFICANT EVENT
Patient sitting on ball. Feeling contractions more. Would like epidural soon.    Cervical Exam  Dilation: 2  Effacement (%): 70  Fetal Station: -3  OB Examiner: Abena MOORE  Fetal Assessment  Movement: Present  Mode: External US  Baseline Fetal Heart Rate (bpm): 130 bpm  Baseline Classification: Normal  Variability: Moderate (Between 6 and 25 BPM)  Pattern: Accelerations  Pattern Observations: patient up to birthing ball, FHR monitor readjusted  FHR Category: Category I      Contraction Frequency: 2-4    A/P:     IOL  - Latent labor  - Titrate pitocin per protocol, currently at 12  - Plan for epidural in 30 minutes, followed by AROM  - CEFM; Cat 1 currently  - GBS neg    Abruption  - Minimal blood loss since admission  - Continue to monitor  - Consider repeat abruption labs if change in clinical status    Discussed with Dr. Kimberly Kraft MD, PGY-2

## 2024-08-01 NOTE — ANESTHESIA PREPROCEDURE EVALUATION
Patient: Norah Khan    Evaluation Method: In-person visit    Procedure Information    Date: 08/01/24  Procedure: Labor Consult         Relevant Problems   Neuro   (+) Generalized anxiety disorder      GYN   (+) Multigravida of advanced maternal age in third trimester ([facility])       Clinical information reviewed:    Allergies  Meds               NPO Detail:  No data recorded     OB/Gyn Evaluation    Present Pregnancy    Patient is pregnant now.   Obstetric History            Physical Exam    Airway  Mallampati: I  TM distance: >3 FB  Neck ROM: full     Cardiovascular    Dental    Pulmonary    Abdominal        Anesthesia Plan    History of general anesthesia?: no  History of complications of general anesthesia?: unknown/emergency    ASA 1     epidural     Anesthetic plan and risks discussed with patient.  Use of blood products discussed with patient who consented to blood products.    Plan discussed with attending.

## 2024-08-01 NOTE — ANESTHESIA PROCEDURE NOTES
Epidural Block    Patient location during procedure: OB  Start time: 8/1/2024 6:08 PM  End time: 8/1/2024 6:28 PM  Reason for block: labor analgesia  Staffing  Performed: resident   Authorized by: Johnnie Bennett DO    Performed by: Kody Busby MD    Preanesthetic Checklist  Completed: patient identified, IV checked, risks and benefits discussed, surgical consent, pre-op evaluation, timeout performed and sterile techniques followed  Block Timeout  RN/Licensed healthcare professional reads aloud to the Anesthesia provider and entire team: Patient identity, procedure with side and site, patient position, and as applicable the availability of implants/special equipment/special requirements.  Patient on coagulant treatment: no  Timeout performed at: 8/1/2024 6:08 PM  Block Placement  Patient position: sitting  Prep: ChloraPrep  Sterility prep: cap, drape, gloves, hand and mask  Sedation level: no sedation  Patient monitoring: blood pressure, continuous pulse oximetry and heart rate  Approach: midline  Local numbing: lidocaine 1% to skin and subcutaneous tissues  Vertebral space: lumbar  Lumbar location: L3-L4  Epidural  Loss of resistance technique: saline  Guidance: landmark technique        Needle  Needle type: Tuohy   Needle gauge: 17  Needle length: 10 cm  Needle insertion depth: 4 cm  Catheter type: multi-orifice  Catheter size: 19 G  Catheter at skin depth: 10 cm  Catheter securement method: clear occlusive dressing    Test dose: lidocaine 1.5% with epinephrine 1-to-200,000  Test dose: lidocaine 1.5% with epinephrine 1-to-200,000  Test dose result: no positive test dose    PCEA  Medication concentration used: 0.044% Bupivacaine with 1.25 mcg/mL Fentanyl and [id number] Epinephrine  Dose (mL): 10  Lockout (minutes): 15  1-Hour Limit (boluses/hr): 4  Basal Rate: 14        Assessment  Sensory level: other bilateral  Block outcome: pain improved  Number of attempts: 1  Events: no positive test dose  Procedure  assessment: patient tolerated procedure well with no immediate complications  Additional Notes  Patient is comfortable with bl T8 level to sharp.

## 2024-08-02 ENCOUNTER — APPOINTMENT (OUTPATIENT)
Dept: OBSTETRICS AND GYNECOLOGY | Facility: CLINIC | Age: 39
End: 2024-08-02
Payer: COMMERCIAL

## 2024-08-02 LAB
APTT PPP: 26 SECONDS (ref 27–38)
APTT PPP: 27 SECONDS (ref 27–38)
APTT PPP: 27 SECONDS (ref 27–38)
BLOOD EXPIRATION DATE: NORMAL
DISPENSE STATUS: NORMAL
ERYTHROCYTE [DISTWIDTH] IN BLOOD BY AUTOMATED COUNT: 14 % (ref 11.5–14.5)
ERYTHROCYTE [DISTWIDTH] IN BLOOD BY AUTOMATED COUNT: 14.1 % (ref 11.5–14.5)
ERYTHROCYTE [DISTWIDTH] IN BLOOD BY AUTOMATED COUNT: 14.5 % (ref 11.5–14.5)
FIBRINOGEN PPP-MCNC: 288 MG/DL (ref 200–400)
FIBRINOGEN PPP-MCNC: 294 MG/DL (ref 200–400)
FIBRINOGEN PPP-MCNC: 355 MG/DL (ref 200–400)
HCT VFR BLD AUTO: 26.7 % (ref 36–46)
HCT VFR BLD AUTO: 28.2 % (ref 36–46)
HCT VFR BLD AUTO: 35.8 % (ref 36–46)
HGB BLD-MCNC: 11.8 G/DL (ref 12–16)
HGB BLD-MCNC: 8.8 G/DL (ref 12–16)
HGB BLD-MCNC: 9.7 G/DL (ref 12–16)
HOLD SPECIMEN: NORMAL
INR PPP: 1 (ref 0.9–1.1)
INR PPP: 1.1 (ref 0.9–1.1)
INR PPP: 1.1 (ref 0.9–1.1)
MCH RBC QN AUTO: 29.7 PG (ref 26–34)
MCH RBC QN AUTO: 30.1 PG (ref 26–34)
MCH RBC QN AUTO: 30.3 PG (ref 26–34)
MCHC RBC AUTO-ENTMCNC: 33 G/DL (ref 32–36)
MCHC RBC AUTO-ENTMCNC: 33 G/DL (ref 32–36)
MCHC RBC AUTO-ENTMCNC: 34.4 G/DL (ref 32–36)
MCV RBC AUTO: 88 FL (ref 80–100)
MCV RBC AUTO: 90 FL (ref 80–100)
MCV RBC AUTO: 92 FL (ref 80–100)
NRBC BLD-RTO: 0 /100 WBCS (ref 0–0)
PLATELET # BLD AUTO: 115 X10*3/UL (ref 150–450)
PLATELET # BLD AUTO: 138 X10*3/UL (ref 150–450)
PLATELET # BLD AUTO: 141 X10*3/UL (ref 150–450)
PRODUCT BLOOD TYPE: 6200
PRODUCT CODE: NORMAL
PROTHROMBIN TIME: 11.5 SECONDS (ref 9.8–12.8)
PROTHROMBIN TIME: 11.9 SECONDS (ref 9.8–12.8)
PROTHROMBIN TIME: 12.1 SECONDS (ref 9.8–12.8)
RBC # BLD AUTO: 2.9 X10*6/UL (ref 4–5.2)
RBC # BLD AUTO: 3.22 X10*6/UL (ref 4–5.2)
RBC # BLD AUTO: 3.97 X10*6/UL (ref 4–5.2)
UNIT ABO: NORMAL
UNIT NUMBER: NORMAL
UNIT RH: NORMAL
UNIT VOLUME: 67
WBC # BLD AUTO: 10.7 X10*3/UL (ref 4.4–11.3)
WBC # BLD AUTO: 14.5 X10*3/UL (ref 4.4–11.3)
WBC # BLD AUTO: 19 X10*3/UL (ref 4.4–11.3)

## 2024-08-02 PROCEDURE — 85384 FIBRINOGEN ACTIVITY: CPT | Performed by: STUDENT IN AN ORGANIZED HEALTH CARE EDUCATION/TRAINING PROGRAM

## 2024-08-02 PROCEDURE — 85027 COMPLETE CBC AUTOMATED: CPT

## 2024-08-02 PROCEDURE — P9045 ALBUMIN (HUMAN), 5%, 250 ML: HCPCS | Mod: JZ | Performed by: STUDENT IN AN ORGANIZED HEALTH CARE EDUCATION/TRAINING PROGRAM

## 2024-08-02 PROCEDURE — 85384 FIBRINOGEN ACTIVITY: CPT

## 2024-08-02 PROCEDURE — 59899 UNLISTED PX MAT CARE&DLVR: CPT

## 2024-08-02 PROCEDURE — 7220000003 HC JADA OB SPECIAL CARE, 4 HOURS

## 2024-08-02 PROCEDURE — 2500000004 HC RX 250 GENERAL PHARMACY W/ HCPCS (ALT 636 FOR OP/ED): Performed by: STUDENT IN AN ORGANIZED HEALTH CARE EDUCATION/TRAINING PROGRAM

## 2024-08-02 PROCEDURE — P9012 CRYOPRECIPITATE EACH UNIT: HCPCS

## 2024-08-02 PROCEDURE — 2500000004 HC RX 250 GENERAL PHARMACY W/ HCPCS (ALT 636 FOR OP/ED): Mod: JZ

## 2024-08-02 PROCEDURE — 36430 TRANSFUSION BLD/BLD COMPNT: CPT

## 2024-08-02 PROCEDURE — 2500000004 HC RX 250 GENERAL PHARMACY W/ HCPCS (ALT 636 FOR OP/ED)

## 2024-08-02 PROCEDURE — 2500000001 HC RX 250 WO HCPCS SELF ADMINISTERED DRUGS (ALT 637 FOR MEDICARE OP)

## 2024-08-02 PROCEDURE — 85027 COMPLETE CBC AUTOMATED: CPT | Performed by: STUDENT IN AN ORGANIZED HEALTH CARE EDUCATION/TRAINING PROGRAM

## 2024-08-02 PROCEDURE — 2500000005 HC RX 250 GENERAL PHARMACY W/O HCPCS: Performed by: STUDENT IN AN ORGANIZED HEALTH CARE EDUCATION/TRAINING PROGRAM

## 2024-08-02 PROCEDURE — 85610 PROTHROMBIN TIME: CPT | Performed by: STUDENT IN AN ORGANIZED HEALTH CARE EDUCATION/TRAINING PROGRAM

## 2024-08-02 PROCEDURE — 85610 PROTHROMBIN TIME: CPT

## 2024-08-02 PROCEDURE — 51701 INSERT BLADDER CATHETER: CPT

## 2024-08-02 PROCEDURE — 2500000001 HC RX 250 WO HCPCS SELF ADMINISTERED DRUGS (ALT 637 FOR MEDICARE OP): Performed by: STUDENT IN AN ORGANIZED HEALTH CARE EDUCATION/TRAINING PROGRAM

## 2024-08-02 PROCEDURE — 2500000005 HC RX 250 GENERAL PHARMACY W/O HCPCS

## 2024-08-02 PROCEDURE — 59160 D & C AFTER DELIVERY: CPT

## 2024-08-02 PROCEDURE — 1210000001 HC SEMI-PRIVATE ROOM DAILY

## 2024-08-02 PROCEDURE — P9016 RBC LEUKOCYTES REDUCED: HCPCS

## 2024-08-02 PROCEDURE — 51702 INSERT TEMP BLADDER CATH: CPT

## 2024-08-02 RX ORDER — LOPERAMIDE HYDROCHLORIDE 2 MG/1
4 CAPSULE ORAL EVERY 2 HOUR PRN
Status: DISCONTINUED | OUTPATIENT
Start: 2024-08-02 | End: 2024-08-04 | Stop reason: HOSPADM

## 2024-08-02 RX ORDER — FLUOXETINE HYDROCHLORIDE 20 MG/1
60 CAPSULE ORAL NIGHTLY
Status: DISCONTINUED | OUTPATIENT
Start: 2024-08-02 | End: 2024-08-02

## 2024-08-02 RX ORDER — MISOPROSTOL 200 UG/1
800 TABLET ORAL ONCE AS NEEDED
Status: DISCONTINUED | OUTPATIENT
Start: 2024-08-02 | End: 2024-08-04 | Stop reason: HOSPADM

## 2024-08-02 RX ORDER — LIDOCAINE 560 MG/1
1 PATCH PERCUTANEOUS; TOPICAL; TRANSDERMAL
Status: DISCONTINUED | OUTPATIENT
Start: 2024-08-02 | End: 2024-08-04 | Stop reason: HOSPADM

## 2024-08-02 RX ORDER — METOCLOPRAMIDE 10 MG/1
10 TABLET ORAL ONCE
Status: COMPLETED | OUTPATIENT
Start: 2024-08-02 | End: 2024-08-02

## 2024-08-02 RX ORDER — ACETAMINOPHEN 325 MG/1
975 TABLET ORAL EVERY 6 HOURS
Status: DISCONTINUED | OUTPATIENT
Start: 2024-08-02 | End: 2024-08-04 | Stop reason: HOSPADM

## 2024-08-02 RX ORDER — CEFAZOLIN 1 G/1
INJECTION, POWDER, FOR SOLUTION INTRAVENOUS AS NEEDED
Status: DISCONTINUED | OUTPATIENT
Start: 2024-08-02 | End: 2024-08-02

## 2024-08-02 RX ORDER — FLUOXETINE HYDROCHLORIDE 20 MG/1
60 CAPSULE ORAL DAILY
Status: DISCONTINUED | OUTPATIENT
Start: 2024-08-02 | End: 2024-08-04 | Stop reason: HOSPADM

## 2024-08-02 RX ORDER — POLYETHYLENE GLYCOL 3350 17 G/17G
17 POWDER, FOR SOLUTION ORAL 2 TIMES DAILY PRN
Status: DISCONTINUED | OUTPATIENT
Start: 2024-08-02 | End: 2024-08-04 | Stop reason: HOSPADM

## 2024-08-02 RX ORDER — LABETALOL HYDROCHLORIDE 5 MG/ML
20 INJECTION, SOLUTION INTRAVENOUS ONCE AS NEEDED
Status: DISCONTINUED | OUTPATIENT
Start: 2024-08-02 | End: 2024-08-04 | Stop reason: HOSPADM

## 2024-08-02 RX ORDER — OXYTOCIN 10 [USP'U]/ML
10 INJECTION, SOLUTION INTRAMUSCULAR; INTRAVENOUS ONCE AS NEEDED
Status: DISCONTINUED | OUTPATIENT
Start: 2024-08-02 | End: 2024-08-04 | Stop reason: HOSPADM

## 2024-08-02 RX ORDER — METHYLERGONOVINE MALEATE 0.2 MG/ML
0.2 INJECTION INTRAVENOUS ONCE AS NEEDED
Status: DISCONTINUED | OUTPATIENT
Start: 2024-08-02 | End: 2024-08-04 | Stop reason: HOSPADM

## 2024-08-02 RX ORDER — ADHESIVE BANDAGE
10 BANDAGE TOPICAL
Status: DISCONTINUED | OUTPATIENT
Start: 2024-08-02 | End: 2024-08-04 | Stop reason: HOSPADM

## 2024-08-02 RX ORDER — MIDAZOLAM HYDROCHLORIDE 1 MG/ML
INJECTION INTRAMUSCULAR; INTRAVENOUS AS NEEDED
Status: DISCONTINUED | OUTPATIENT
Start: 2024-08-02 | End: 2024-08-02

## 2024-08-02 RX ORDER — CEFAZOLIN SODIUM 2 G/100ML
2 INJECTION, SOLUTION INTRAVENOUS ONCE
Status: COMPLETED | OUTPATIENT
Start: 2024-08-02 | End: 2024-08-02

## 2024-08-02 RX ORDER — FENTANYL CITRATE 50 UG/ML
INJECTION, SOLUTION INTRAMUSCULAR; INTRAVENOUS AS NEEDED
Status: DISCONTINUED | OUTPATIENT
Start: 2024-08-02 | End: 2024-08-02

## 2024-08-02 RX ORDER — IBUPROFEN 600 MG/1
600 TABLET ORAL EVERY 6 HOURS
Status: DISCONTINUED | OUTPATIENT
Start: 2024-08-02 | End: 2024-08-04 | Stop reason: HOSPADM

## 2024-08-02 RX ORDER — ONDANSETRON 4 MG/1
4 TABLET, FILM COATED ORAL EVERY 6 HOURS PRN
Status: DISCONTINUED | OUTPATIENT
Start: 2024-08-02 | End: 2024-08-04 | Stop reason: HOSPADM

## 2024-08-02 RX ORDER — CARBOPROST TROMETHAMINE 250 UG/ML
250 INJECTION, SOLUTION INTRAMUSCULAR ONCE AS NEEDED
Status: DISCONTINUED | OUTPATIENT
Start: 2024-08-02 | End: 2024-08-04 | Stop reason: HOSPADM

## 2024-08-02 RX ORDER — BISACODYL 10 MG/1
10 SUPPOSITORY RECTAL DAILY PRN
Status: DISCONTINUED | OUTPATIENT
Start: 2024-08-02 | End: 2024-08-04 | Stop reason: HOSPADM

## 2024-08-02 RX ORDER — FAMOTIDINE 10 MG/ML
INJECTION INTRAVENOUS AS NEEDED
Status: DISCONTINUED | OUTPATIENT
Start: 2024-08-01 | End: 2024-08-02

## 2024-08-02 RX ORDER — NIFEDIPINE 10 MG/1
10 CAPSULE ORAL ONCE AS NEEDED
Status: DISCONTINUED | OUTPATIENT
Start: 2024-08-02 | End: 2024-08-04 | Stop reason: HOSPADM

## 2024-08-02 RX ORDER — NORETHINDRONE AND ETHINYL ESTRADIOL 0.5-0.035
KIT ORAL AS NEEDED
Status: DISCONTINUED | OUTPATIENT
Start: 2024-08-02 | End: 2024-08-02

## 2024-08-02 RX ORDER — OXYTOCIN/0.9 % SODIUM CHLORIDE 30/500 ML
60 PLASTIC BAG, INJECTION (ML) INTRAVENOUS ONCE AS NEEDED
Status: DISCONTINUED | OUTPATIENT
Start: 2024-08-02 | End: 2024-08-04 | Stop reason: HOSPADM

## 2024-08-02 RX ORDER — SIMETHICONE 80 MG
80 TABLET,CHEWABLE ORAL 4 TIMES DAILY PRN
Status: DISCONTINUED | OUTPATIENT
Start: 2024-08-02 | End: 2024-08-04 | Stop reason: HOSPADM

## 2024-08-02 RX ORDER — HYDRALAZINE HYDROCHLORIDE 20 MG/ML
5 INJECTION INTRAMUSCULAR; INTRAVENOUS ONCE AS NEEDED
Status: DISCONTINUED | OUTPATIENT
Start: 2024-08-02 | End: 2024-08-04 | Stop reason: HOSPADM

## 2024-08-02 RX ORDER — DIPHENHYDRAMINE HYDROCHLORIDE 50 MG/ML
25 INJECTION INTRAMUSCULAR; INTRAVENOUS EVERY 6 HOURS PRN
Status: DISCONTINUED | OUTPATIENT
Start: 2024-08-02 | End: 2024-08-04 | Stop reason: HOSPADM

## 2024-08-02 RX ORDER — LIDOCAINE HCL/EPINEPHRINE/PF 2%-1:200K
VIAL (ML) INJECTION AS NEEDED
Status: DISCONTINUED | OUTPATIENT
Start: 2024-08-01 | End: 2024-08-02

## 2024-08-02 RX ORDER — ONDANSETRON HYDROCHLORIDE 2 MG/ML
4 INJECTION, SOLUTION INTRAVENOUS EVERY 6 HOURS PRN
Status: DISCONTINUED | OUTPATIENT
Start: 2024-08-02 | End: 2024-08-04 | Stop reason: HOSPADM

## 2024-08-02 RX ORDER — TRANEXAMIC ACID 100 MG/ML
1000 INJECTION, SOLUTION INTRAVENOUS ONCE AS NEEDED
Status: DISCONTINUED | OUTPATIENT
Start: 2024-08-02 | End: 2024-08-04 | Stop reason: HOSPADM

## 2024-08-02 RX ORDER — HYDROMORPHONE HYDROCHLORIDE 1 MG/ML
0.2 INJECTION, SOLUTION INTRAMUSCULAR; INTRAVENOUS; SUBCUTANEOUS EVERY 5 MIN PRN
Status: DISCONTINUED | OUTPATIENT
Start: 2024-08-02 | End: 2024-08-02 | Stop reason: HOSPADM

## 2024-08-02 RX ORDER — PHENYLEPHRINE HCL IN 0.9% NACL 0.4MG/10ML
SYRINGE (ML) INTRAVENOUS AS NEEDED
Status: DISCONTINUED | OUTPATIENT
Start: 2024-08-02 | End: 2024-08-02

## 2024-08-02 RX ORDER — ALBUMIN HUMAN 50 G/1000ML
SOLUTION INTRAVENOUS AS NEEDED
Status: DISCONTINUED | OUTPATIENT
Start: 2024-08-02 | End: 2024-08-02

## 2024-08-02 RX ORDER — DIPHENHYDRAMINE HCL 25 MG
25 CAPSULE ORAL EVERY 6 HOURS PRN
Status: DISCONTINUED | OUTPATIENT
Start: 2024-08-02 | End: 2024-08-04 | Stop reason: HOSPADM

## 2024-08-02 RX ORDER — DIPHENHYDRAMINE HCL 25 MG
25 CAPSULE ORAL ONCE
Status: COMPLETED | OUTPATIENT
Start: 2024-08-02 | End: 2024-08-02

## 2024-08-02 ASSESSMENT — PAIN SCALES - GENERAL
PAINLEVEL_OUTOF10: 0 - NO PAIN
PAINLEVEL_OUTOF10: 0 - NO PAIN
PAINLEVEL_OUTOF10: 3
PAINLEVEL_OUTOF10: 0 - NO PAIN
PAINLEVEL_OUTOF10: 1
PAINLEVEL_OUTOF10: 0 - NO PAIN
PAINLEVEL_OUTOF10: 2
PAINLEVEL_OUTOF10: 0 - NO PAIN
PAINLEVEL_OUTOF10: 3
PAINLEVEL_OUTOF10: 0 - NO PAIN

## 2024-08-02 ASSESSMENT — PAIN DESCRIPTION - LOCATION: LOCATION: ABDOMEN

## 2024-08-02 ASSESSMENT — PAIN SCALES - PAIN ASSESSMENT IN ADVANCED DEMENTIA (PAINAD): TOTALSCORE: WARM PACK

## 2024-08-02 NOTE — DISCHARGE INSTRUCTIONS

## 2024-08-02 NOTE — SIGNIFICANT EVENT
At bedside to assess Gege. 50 ml of blood in cannister- stable over last hour. Some blood in tubing. Patient just received 1 unit cryoprecipitate. Will turn off Gege suction now. Will continue to monitor for further bleeding.    Will obtain 8am labs - cbc, fibrinogen, coags.    Given intrauterine manipulation and time of gege in place, will redose ancef at this time.    Discussed with Dr. Portillo Myrick MD PGY-2

## 2024-08-02 NOTE — PROGRESS NOTES
Postpartum Progress Note    Assessment/Plan   Norah Khan is a 38 y.o., , who delivered at 38w0d gestation and is now postpartum day 1.    Postpartum Hemorrhage  -Total QBL: 1700  - POD#0 s/p Suction D&C for retained POC  - s/p Gege w/ 150cc balloon, removed 1100  - s/p 1u pRBC and 1u cryoprecipitate  - S/p 2 doses of Ancef for operative ppx  - Crossed for an additional unit of 1 unit of cryoprecipitate and 4 units pRBC  - Lab Trend      Hgb12.9 > 11.8 > 9.7       > 115     Fibrinogen 294 > 288     Coags wnl  - Given decrease in fibrinogen in AM labs, will plan to transfuse an additional 1u cryo now  - Repeat Labs at 1500 , if appropriate will consider transfer to postpartum  - IF bleeding continues, will contact GYN/Oncology and IR team for possible UAE if appropriate       S/P  on 24  - Doing well, pain well controlled with PO meds, afebrile, tolerating diet, voiding  - Anti-emetics PRN and bowel regimen ordered  - Continue routine postpartum care.   - Can have diet now that bleeding stable    Feeding - breast/pumping  - Continue to encourage    Birth control   - Plans for POPs    DVT ppx  - DVT risk score 3  - Ambulation, SCDs, lovenox ppx deferred in the setting of PPH    Dispo  - Anticipate discharge on PPD#3-4 if continues to meet milestones and bleeding stable  - Plan for postpartum visit 4-6 weeks after discharge    Plan of care d/w Dr. Dela Cruz-Fredy Jacob MD PGY-4    Attending attestation:   I saw and evaluated the patient. I personally obtained the key and critical portions of the history and physical exam or was physically present for key and critical portions performed by the resident/GUERRERO. I reviewed the resident/GUERRERO's documentation and discussed the patient with the resident/GUERRERO. I agree with the resident/GUERRERO's medical decision making as documented in the note.     S/p Gege removal. Bleeding minimal. Given drop in fibrinogen, will give additional 1 unit cryo  and repeat labs at 1500. If continuing to downtrend, will consider IR consult for possible uterine artery embolization. VSS.    Results from last 7 days   Lab Units 08/02/24  0801 08/02/24  0145 08/01/24  1039   WBC AUTO x10*3/uL 14.5* 19.0* 8.2   HEMOGLOBIN g/dL 9.7* 11.8* 12.9   HEMATOCRIT % 28.2* 35.8* 37.9   PLATELETS AUTO x10*3/uL 115* 138* 161   PROTIME seconds 12.1 11.9 11.1   INR  1.1 1.1 1.0   FIBRINOGEN mg/dL 288 294 563*       Porsha Villalba MD  OBGYN Attending       Principal Problem:    Labor and delivery indication for care or intervention ([facility])    Pregnancy Problems (from 01/12/24 to present)       Problem Noted Resolved    Labor and delivery indication for care or intervention ([facility]) 8/1/2024 by Estefany Kraft MD No    Priority:  Medium      Third trimester pregnancy ([facility]) 7/8/2024 by Dionna Bobby MD No    Priority:  Medium      Vaginal bleeding in pregnancy ([facility]) 6/16/2024 by Estefany Kraft MD No    Priority:  Medium      Overview Signed 6/18/2024  8:00 AM by Raven Diallo MD     - admitted 6/16-18 for large volume bleeding at home, bleeding scant in hospital with normal labs  - placenta low-lying (~1.3mm from os, limited measurements on TVUS 6/17). Previa resolved.          Obstetric varicose veins, third trimester ([facility]) 5/8/2024 by Kelly Regan MD No    Priority:  Medium      Overview Signed 7/24/2024  1:59 PM by Kelly Regan MD     Pelvic MRI on 7/24/24 to assess anatomy         History of postpartum hypertension 4/9/2024 by Kelly Regan MD No    Priority:  Medium      Overview Signed 4/9/2024 11:21 AM by Kelly Regan MD     Takes bASA 162 mg         Low lying placenta, antepartum ([facility]) 4/2/2024 by Kelly Regan MD No    Priority:  Medium      Overview Addendum 7/24/2024  1:59 PM by Kelly Regan MD     Diagnosed @ 19+ wks 3/21/24 (anatomy scan)  Anterior placenta  No history of prior uterine surgery  Had spotting prior to diagnosis of  placenta previa at time of anatomy scan  Previa resolved @ 29 wks scan, low lying (2 mm away)  No longer low-lying @ 33+ wks         Multigravida of advanced maternal age in third trimester ([facility]) 3/8/2024 by Kelly Regan MD No    Priority:  Medium      Generalized anxiety disorder 1/19/2024 by Yoko Singh, PhD No    Priority:  Medium            Hospital course: postpartum hemorrhage treated with massage, pitocin, methergine, hemabate, and Gege balloon      Subjective   Her pain is well controlled with current medications  She is not passing flatus  She is not ambulating well  She is tolerating a Adult diet Regular  She reports no breast or nursing problems  She denies emotional concerns today   Her plan for contraception is POPs         Objective   Allergies:   Suprax [cefixime]         Last Vitals:  Temp Pulse Resp BP MAP Pulse Ox   (!) 35.9 °C (96.6 °F) 89 16 110/58 77 (!) 91 %     Vitals Min/Max Last 24 Hours:  Temp  Min: 35.9 °C (96.6 °F)  Max: 37.2 °C (99 °F)  Pulse  Min: 71  Max: 121  Resp  Min: 16  Max: 20  BP  Min: 95/63  Max: 138/64  MAP (mmHg)  Min: 72  Max: 102    Intake/Output:     Intake/Output Summary (Last 24 hours) at 8/2/2024 1120  Last data filed at 8/2/2024 0832  Gross per 24 hour   Intake 5117.85 ml   Output 7386 ml   Net -2268.15 ml       Physical Exam:  General: Examination reveals a well developed, well nourished, female, in no acute distress. She is alert and cooperative.  HEENT: PERRLA. External ears normal. Nose normal, no erythema or discharge. Mouth and throat clear.  Fundus: firm.  Perineum: well approximated.  Extremities: no redness or tenderness in the calves or thighs, no edema.  Neurological: alert, oriented, normal speech, no focal findings or movement disorder noted.  Psychological: awake and alert; oriented to person, place, and time.    Lab Data:  Lab Results   Component Value Date    ABO A 08/01/2024    LABRH POS 08/01/2024    ABSCRN NEG 08/01/2024     Lab Results  "  Component Value Date    WBC 14.5 (H) 08/02/2024    HGB 9.7 (L) 08/02/2024    HCT 28.2 (L) 08/02/2024     (L) 08/02/2024     0   Lab Value Date/Time    GRPBSTREP No Group B Streptococcus (GBS) isolated 07/19/2024 1318    GRPBSTREP No Group B Streptococcus (GBS) isolated 06/16/2024 0208     No results found for: \"GLUCOSE\", \"NA\", \"K\", \"CL\", \"CO2\", \"ANIONGAP\", \"BUN\", \"CREATININE\", \"EGFR\", \"CALCIUM\", \"ALBUMIN\", \"PROT\", \"ALKPHOS\", \"ALT\", \"AST\", \"BILITOT\"  0   Lab Value Date/Time    UTPCR SEE COMMENT 10/25/2021 1620     Lab Results   Component Value Date    APTT 27 08/02/2024    PROTIME 12.1 08/02/2024    INR 1.1 08/02/2024     Lab Results   Component Value Date    FIBRINOGEN 288 08/02/2024     No results found for: \"BNP\"  No results found for: \"LACTATE\"  "

## 2024-08-02 NOTE — OP NOTE
Date: 2024 - 2024  [place] [facility] [place] [facility] [place]    Name: Norah Khan, : 1985, Age: 38 y.o., MRN: [id number], Sex: female    Diagnosis  Pre-op Diagnosis      * Retained products of conception after delivery with complications (HHS-HCC) [O73.1] Post-op Diagnosis     * Retained products of conception after delivery with complications (HHS-HCC) [O73.1]     Procedures  OBGYN D and C  15217 - [place] DILATION & CURETTAGE DX&/THER NONOBSTETRIC      Surgeons      * Yudelka Kurtz - Primary    Resident/Fellow/Other Assistant:  Surgeons and Role:     Mehran Gusman MD PGY -4     Bailey Myrick MD PGY-2      Procedure Summary  Anesthesia: Epidural  ASA: I  Anesthesia Staff: Anesthesiologist: Johnnie Bennett DO  Anesthesia Resident: Kody Busby MD  Estimated Blood Loss: mL  Intra-op Medications: * Intraprocedure medication information is unavailable because the case start and end events have not been set *           Anesthesia Record               Intraprocedure I/O Totals          Intake    Transfuse RBC :30 Minutes 300.00 mL    Dexmedetomidine 0.00 mL    The total shown is the total volume documented since Anesthesia Start was filed.    LR 1000.00 mL    Oxytocin Drip 0.00 mL    The total shown is the total volume documented since Anesthesia Start was filed.    Total Intake 1300 mL       Output    Urine 450 mL    Total Blood Loss - Surgical Delivery (mL) 800 mL    Total Output 1250 mL       Net    Net Volume 50 mL       Other (could not be determined as input or output)    Surgical Delivery Estimated Blood Loss (mL) 800          Specimen:   ID Type Source Tests Collected by Time   1 : placenta and placental tissue from D&C Tissue PLACENTA SINGLE SURGICAL PATHOLOGY EXAM Yudelka Kurtz MD 2024 [id number]        Staff:   Scrub Person: [place]    Indications: persistent bleeding following  and delivery of placenta. US showing retained POC that was unable to be removed with pitocin, cytotec, methergine,  manual extraction.     Procedure Details:  The patient was seen in the preoperative area. The site of surgery was properly noted/marked if necessary per policy. The patient has been actively warmed in preoperative area. Ancef given. Venous thrombosis prophylaxis have been ordered including bilateral sequential compression devices    Findings: US with thin endometrial stripe after D&C. Irregular endometrial surface consistent with myometrium after procedure.    Pt was taken to the OR where epidural was redosed. She was then placed in the dorsal lithotomy position. Straight catheterization of the bladder yielded 300cc of clear yellow urine. She was then prepped and draped in the usual sterile fashion. A speculum was used to visualize the cervix. A ring forceps was used to grasp the anterior lip of the cervix. Sharp curretage was performed under ultrasound guidance with moderate sized clot of blood and tissue removed. 9mm suction D&C performed. US showed thin endometrial stripe. However, bleeding persisted. On bimanual exam, small amount of tissue removed. Endometrium was irregular on palpation, concerning for myometrium and possible PAS. No further placental tissue palpated. Given persistent bleeding, Gege placed in the standard fashion. Balloon filled with 150cc sterile saline for an adequate seal on dilated cervix. Bowen catheter placed. No further bleeding around Gege. All instruments were from the vagina.     Approximate EBL from delivery and procedure ~1400cc. Patient feeling very cold at end of procedure. Given this and estimated blood loss at this point, 1 unit pRBCs given. Labs obtained. After administration of blood, patient's symptoms and BP improved.    All counts were correct, the patient tolerated the procedure well.  Dr. Nath was present for the entire procedure.  The patient was taken to PACU in stable condition.    Complications:  None; patient tolerated the procedure well.     Disposition:  LDR  11  Condition: stable

## 2024-08-02 NOTE — CARE PLAN
The patient's goals for the shift include None to minimal vaginal bleeding    The clinical goals for the shift include Category I FHR, safe delivery    Over the shift, the patient made progress toward the above clinical goals. Over  the shift, the patient did not make progress toward the  following goals. Barriers to progression include PPH with adherent placenta. Recommendations to address these barriers include uterotonics as ordered, frequent assessment of CAMI output..    Problem: Vaginal Birth or  Section  Goal: No s/sx of hemorrhage through recovery  Outcome: Not Progressing

## 2024-08-02 NOTE — SIGNIFICANT EVENT
To bedside to check in on patient. PM labs appropriate, Fibrinogen now 355, previously 288. Bleeding stable with no cramping, lightheadedness or dizziness, no acute concerns. Stable for transfer to postpartum.    D/w Dr. Jose De Jesus Jacob MD PGY-4

## 2024-08-02 NOTE — SIGNIFICANT EVENT
"Labor progress note    S: House officers to pt bedside for AROM. Pt feels comfy on her epidural. Reporting some itchiness, otherwise says the epidural is working. Amenable to cervical check and AROM.      O: /70   Pulse 77   Temp 36.4 °C (97.5 °F) (Temporal)   Resp 18   Ht 1.651 m (5' 5\")   Wt 79.3 kg (174 lb 13.2 oz)   LMP 11/09/2023 (Exact Date)   SpO2 98%   BMI 29.09 kg/m²     Cervical exam: 3/50/-3 per Dr. Myrick  Cervical Exam  Dilation: 3  Effacement (%): 50  Fetal Station: -3  Method: Manual  OB Examiner: Ramez MOORE  Fetal Assessment  Movement: Present  Mode: External US  Baseline Fetal Heart Rate (bpm): 120 bpm  Baseline Classification: Normal  Variability: Moderate (Between 6 and 25 BPM)  Pattern: Accelerations  Pattern Observations: Repositioned to left tilt after epidural placement  FHR Category: Category I      Contraction Frequency: 2-4    A/P:   IOL  Epidural running, pitocin per protocol  3/50/-3, AROM 2020  GBS negative  EFW 3004 g (66%), AC 88% 7/19 US --> extrapolated to 3500 g  CEFM, currently Cat I  Anticipate vaginal delivery      Pt seen and d/w Dr. Ramez Brand MD  PGY-1, Obstetrics and Gynecology     "

## 2024-08-02 NOTE — ANESTHESIA POSTPROCEDURE EVALUATION
Patient: Norah Khan    Procedure Summary       Date: 08/01/24 Room / Location: [facility] OB    Anesthesia Start: 2357 Anesthesia Stop: 08/02/24 0124    Procedures:       OB EMERGENT SURGERY      OBGYN D and C (Vagina ) Diagnosis:       Retained products of conception after delivery with complications ([facility]HS-HCC)      (Emergent OB Procedure)    Surgeons: Yudelka Kurtz MD Responsible Provider: Johnnie Bennett DO    Anesthesia Type: epidural ASA Status: 1            Anesthesia Type: epidural    Vitals Value Taken Time   /65 08/02/24 0124   Temp 36 08/02/24 0124   Pulse 95 08/02/24 0124   Resp 14 08/02/24 0124   SpO2 100 08/02/24 0124       Anesthesia Post Evaluation    Patient location during evaluation: bedside  Patient participation: complete - patient participated  Level of consciousness: awake  Pain management: adequate  Airway patency: patent  Cardiovascular status: acceptable  Respiratory status: acceptable  Hydration status: acceptable  Postoperative Nausea and Vomiting: none        No notable events documented.

## 2024-08-02 NOTE — ANESTHESIA PREPROCEDURE EVALUATION
Patient: Norah Khan    Evaluation Method: In-person visit    Procedure Information    Date: 08/01/24  Procedure: Labor Consult         Relevant Problems   Neuro   (+) Generalized anxiety disorder      GYN   (+) Multigravida of advanced maternal age in third trimester ([facility])       Clinical information reviewed:    Allergies  Meds               NPO Detail:  No data recorded     OB/Gyn Evaluation    Present Pregnancy    Patient is pregnant now.   Obstetric History                Physical Exam    Airway  Mallampati: I  TM distance: >3 FB  Neck ROM: full     Cardiovascular    Dental    Pulmonary    Abdominal          Anesthesia Plan    History of general anesthesia?: no  History of complications of general anesthesia?: unknown/emergency    ASA 1     epidural     Anesthetic plan and risks discussed with patient.  Use of blood products discussed with patient who consented to blood products.    Plan discussed with attending.

## 2024-08-02 NOTE — SIGNIFICANT EVENT
Following the  the placental delivered easily however upon inspection of the placenta the edge appeared to not be continuous with the membranes and suspicion for accessory lobe. A bedside US was performed and there appeared to be retained POC. Uterine atony also present. Patient given pitocin, Cytotec, methergine with uterine massage and attempted manual extraction of the placenta. The placenta was densely adherent and patient advised of the need for suction D&C in the OR . She consented and was moved to the OR. Sharp curettage and suction D&C performed. There was a thin yosef on the US however manual palpation of the endometrial surface ws palpated to be irregular consistent disruption of the myometrium superficially. Concern for possibility for PAS. No discrete placental material could be palpated, but again the endometrial surface disrupted. CAMI placed and balloon filled with 150 ml of sterile saline to create an adequate seal of the enlarged dilated cervix and bulb was palpated to be in the appropriate location and no vaginal bleeding noted. BUS demonstrated a thin midline without blood collecting within the endometrial cavity   QBL/EBL 1400 ml (noted as both due to blood fluid urine etc)

## 2024-08-02 NOTE — L&D DELIVERY NOTE
OB Delivery Note  2024  Norah M Erin  38 y.o.   Vaginal, Spontaneous       Gestational Age: 38w1d  /Para:   Quantitative Blood Loss: Admission to Discharge: 2316 mL (2024  9:37 AM - 2024  2:31 AM)    Mukesh Khan [[id number]]      Labor Events    Sac identifier: Sac 1  Rupture date/time: 2024 2019  Rupture type: Artificial  Fluid color: Bloody  Fluid odor: None  Labor type: Induced Onset of Labor  Labor allowed to proceed with plans for an attempted vaginal birth?: Yes  Induction: Oxytocin, AROM  Induction date/time: 2024 1100  Induction indications: Other  Complications: Uterine Atony  Additional complications: Retained placenta, unspecified portion of placenta ([facility])       Labor Length    3rd stage: 0h 05m       Placenta    Placenta delivery date/time: 2024 2329  Placenta removal: Spontaneous  Placenta appearance: Adherent  Placenta disposition: pathology       Cord    Vessels: 3 vessels  Complications: None  Delayed cord clamping?: Yes  Cord clamped date/time: 2024 23:25:00  Cord blood disposition: Lab  Gases sent?: Yes  Stem cell collection (by provider): No       Lacerations    Episiotomy: None  Perineal laceration: None  Other lacerations?: No  Repair suture: None       Anesthesia    Method: Epidural       Operative Delivery    Forceps attempted?: No  Vacuum extractor attempted?: No       Shoulder Dystocia    Shoulder dystocia present?: No        Delivery    Birth date/time: 2024 23:24:00  Delivery type: Vaginal, Spontaneous  Complications: Uterine Atony       Resuscitation    Method: Suctioning, Tactile stimulation, Supplemental oxygen       Apgars    Living status: Living  Apgar Component Scores:  1 min.:  5 min.:  10 min.:  15 min.:  20 min.:    Skin color:  0  0       Heart rate:  2  2       Reflex irritability:  1  2       Muscle tone:  2  2       Respiratory effort:  1  2       Total:  6  8       Apgars assigned by: ELICEO CINTRON RN        Delivery Providers    Delivering clinician: Yudelka Kurtz MD   Provider Role    Ella Batista, RN Delivery Nurse    Yina Crandall, RN Nursery Nurse    Elizabeth Brand MD Resident                     Elizabeth Brand MD    Pt seen and d/w Dr. Portillo Brand MD  PGY-1, Obstetrics and Gynecology

## 2024-08-02 NOTE — SIGNIFICANT EVENT
Patient meets criteria for home monitoring of blood pressure post discharge.  Met with patient to assess for availability of home BP monitor.  Patient stated she owns home BP monitor. Patient educated on importance of continuing to monitor BP at home, recording BP on home monitoring log and s/sx of when to call her provider.  Pt verbalized understanding the above information.     Stephany Rojas RN

## 2024-08-03 LAB
BLOOD EXPIRATION DATE: NORMAL
DISPENSE STATUS: NORMAL
PRODUCT BLOOD TYPE: 6200
PRODUCT CODE: NORMAL
UNIT ABO: NORMAL
UNIT NUMBER: NORMAL
UNIT RH: NORMAL
UNIT VOLUME: 94

## 2024-08-03 PROCEDURE — 2500000001 HC RX 250 WO HCPCS SELF ADMINISTERED DRUGS (ALT 637 FOR MEDICARE OP)

## 2024-08-03 PROCEDURE — 1210000001 HC SEMI-PRIVATE ROOM DAILY

## 2024-08-03 PROCEDURE — 2500000005 HC RX 250 GENERAL PHARMACY W/O HCPCS

## 2024-08-03 ASSESSMENT — PAIN SCALES - GENERAL
PAINLEVEL_OUTOF10: 1
PAIN_LEVEL: 0
PAINLEVEL_OUTOF10: 1
PAINLEVEL_OUTOF10: 3
PAINLEVEL_OUTOF10: 3
PAINLEVEL_OUTOF10: 2

## 2024-08-03 ASSESSMENT — PAIN DESCRIPTION - LOCATION
LOCATION: BREAST
LOCATION: BACK

## 2024-08-03 NOTE — ANESTHESIA POSTPROCEDURE EVALUATION
Patient: Norah Khan    Procedure Summary       Date: 24 Room / Location:     Anesthesia Start:  Anesthesia Stop:     Procedure: Labor Analgesia Diagnosis:     Scheduled Providers:  Responsible Provider: Johnnie Bennett DO    Anesthesia Type: epidural ASA Status: 1            Anesthesia Type: epidural      Anesthesia Post Evaluation    Patient location during evaluation: floor  Patient participation: complete - patient participated  Level of consciousness: awake and alert  Pain score: 0  Pain management: adequate  Airway patency: patent  Cardiovascular status: acceptable  Respiratory status: acceptable  Hydration status: acceptable  Postoperative Nausea and Vomiting: none        Norah Khan is a 38 y.o., , who had a Vaginal, Spontaneous delivery on 2024 at 38w0d and is now POD2.    She had Neuraxial Anesthesia without immediate complications noted.       Pain well controlled    Vitals:    24 0335   BP: 105/65   Pulse: 84   Resp: 18   Temp: 36.4 °C (97.5 °F)   SpO2: 99%       [unfilled]    No notable events documented.    Neuraxial site assessed. No visible redness or swelling or drainage. Patient able to ambulate and move all extremities without difficulty. Able to void. No complaints of nausea/vomiting. Tolerating PO intake well. No s/sx of PDPH.     Anesthesia will sign off     Delta Cosby MD        No notable events documented.

## 2024-08-03 NOTE — LACTATION NOTE
Lactation Consultant Note  Lactation Consultation  Reason for Consult: Follow-up assessment, Breast/nipple pain  Consultant Name: Anushka Anderson RN, IBCLC    Maternal Information  Has mother  before?: Yes  Previous Maternal Breastfeeding Challenges: Breast/nipple pain, Difficult latch  Infant to breast within first 2 hours of birth?: Yes  Exclusive Pump and Bottle Feed: No    Maternal Assessment  Breast Assessment: Medium, Symmetrical, Compressible, Other (Comment) (expressible)  Nipple Assessment: Intact, Red/inflamed, Sore, Compression stripe, Other (Comment) (slightly reddeend compression stripe from previous shallow latch)  Alterations in Nipple Condition: Stage I - pain or irritation with no skin break down  Areola Assessment: Normal    Infant Assessment  Infant Behavior: Awake, Readiness to feed, Feeding cues observed, Suckles on and off, needs stimulation, Content after feeding  Infant Assessment: Palate - high/arch/bubble/normal, Good cupping of tongue, Able to elevate tongue to roof of mouth, Tongue protrudes over alveolar ridge    Feeding Assessment  Nutrition Source: Breastmilk  Feeding Method: Nursing at the breast  Unable to assess infant feeding at this time: Other (Comment) (mom eating breakfast at this time - baby sleeping- mom stated she will call for assistance at the next feeding)  Feeding Position: C - hold, Cradle, Cross - cradle, Both sides, Nipple to nose, Mother needs assistance with latch/positioning, Misalignment of baby's head, trunk, and hips, Infant not tucked close and facing mother  Suck/Feeding: Sustained, Coordinated suck/swallow/breathe, Tactile stimulation needed, Audible swallowing with stimulaton  Latch Assessment: Minimal assistance is needed, Instructed on deep latch, Eagerly grasped on to latch, Areolar attachment, Deep latch obtained, Optimal angle of mouth opening, Flanged lips, Comfortable latch, Other (Comment) (initial latch on uncomfortable but, then  after a few sucks it is comfortable)    LATCH TOOL  Latch: Grasps breast, tongue down, lips flanged, rhythmic sucking  Audible Swallowing: Spontaneous and intermittent (24 hours old)  Type of Nipple: Everted (After stimulation)  Comfort (Breast/Nipple): Filling, red/small blisters/bruises, mild/moderate discomfort  Hold (Positioning): Minimal assist, teach one side, mother does other, staff holds  LATCH Score: 8    Breast Pump       Other OB Lactation Tools  Lactation Tools: Comfort gels    Patient Follow-up  Outpatient Lactation Follow-up: Recommended    Other OB Lactation Documentation  Maternal Risk Factors: Hypertension  Infant Risk Factors: Early term birth 37-39 weeks    Recommendations/Summary  Called to room to observe/ assist with feeding.   Encouraged mom to use pillow support and to bring baby to her; not her to baby.   Reviewed positioning of baby (in cross cradle hold on both breasts and then switched latch to cradle hold once the latch was well established and comfortable), use of pillow support, hand placement on baby and on breast, expression of colostrum to the nipple prior to latching, latching technique, and use of breast compression and stimulation to keep the baby feeding at the breast longer.  Deeper latch obtained and mom stated after the initial latch on discomfort subsided, the latch was comfortable. Noted many swallows.   Baby came off the second breast and was content    Reviewed feeding cues, breat feeding on demand, output on different days of life, average percentage of weight loss, milk production/ supply, and the other breastfeeding education topics.      Encouraged mom to breastfeed on demand with a goal of 8-12 feeds in a 24 hour period.   If baby is not showing feeding cues and it has been 3 hours since the last time the baby was fed or the last time the baby attempted to feed, encouraged her to place baby in skin to skin.      Mom anticipates discharge today;  Encouraged her to  utilize the outpatient lactation resources, if needed.   Contact information given.   [phone] [facility] or [phone] Hu      Mom has a breast pump for at home.     Questions answered.

## 2024-08-03 NOTE — LACTATION NOTE
Lactation Consultant Note  Lactation Consultation  Reason for Consult: Initial assessment  Consultant Name: Anushka Anderson RN, IBCLC    Maternal Information  Has mother  before?: Yes  Infant to breast within first 2 hours of birth?: Yes    Maternal Assessment  Breast Assessment: Other (Comment) (d/f - mom eating breakfast at this time)  Nipple Assessment: Sore    Infant Assessment  Infant Behavior: Deep sleep    Feeding Assessment  Nutrition Source: Breastmilk  Feeding Method: Nursing at the breast  Unable to assess infant feeding at this time: Other (Comment) (mom eating breakfast at this time - baby sleeping- mom stated she will call for assistance at the next feeding)    LATCH TOOL       Breast Pump       Other OB Lactation Tools       Patient Follow-up       Other OB Lactation Documentation  Maternal Risk Factors: Hypertension  Infant Risk Factors: Early term birth 37-39 weeks    Recommendations/Summary  I did not view a latch at this time.   Mom c/o pain at times with latch.   Reviewed the importance of a deep latch for her comfort and for proper milk transfer.   Mom eating her breakfast- encouraged her to call at the next feeding.   She verbalized understanding.

## 2024-08-03 NOTE — PROGRESS NOTES
Postpartum Progress Note    Assessment/Plan   Norah Khan is a 38 y.o., , who delivered at 38w0d gestation and is now postpartum day 2.    Postpartum Hemorrhage  -Total QBL: 1700  - POD#1 s/p Suction D&C for retained POC, - s/p Gege w/ 150cc balloon, removed.   - s/p 1u pRBC and 2u cryoprecipitate  - Crossed for an additional unit of 1 unit of cryoprecipitate and 4 units pRBC  - Lab Trend      Hgb 12.9 > 11.8 > 9.7>8.8       > 115>141     Fibrinogen 294 > 288>355     Coags wnl  - Bleeding currently stable. If further bleeding, plan to contact GYN/Oncology and IR team for possible UAE if appropriate  - Plan to discharge home with iron       S/P  on 24  - Doing well, pain well controlled with PO meds, afebrile, tolerating diet, voiding  - Anti-emetics PRN and bowel regimen ordered  - Continue routine postpartum care    Feeding - breast/pumping  - Continue to encourage    Birth control   - Plans for POPs    DVT ppx  - DVT risk score 3  - Ambulation, SCDs, lovenox ppx deferred in the setting of PPH    Dispo  - Anticipate discharge on PPD#3-4 if continues to meet milestones and bleeding stable  - Plan for postpartum visit 4-6 weeks after discharge    Plan of care d/w Dr. Vargas Green MD PGY-4      Hospital course: postpartum hemorrhage treated with massage, pitocin, methergine, hemabate, and Gege balloon      Subjective   Her pain is well controlled with current medications  She is ambulating well  She is tolerating a Adult diet Regular  She reports nipple tenderness  She denies emotional concerns today   Her plan for contraception is POPs  Bleeding is minimal, denies lightheadedness    Objective   Allergies:   Suprax [cefixime]         Last Vitals:  Temp Pulse Resp BP MAP Pulse Ox   36.4 °C (97.5 °F) 84 18 105/65 79 99 %     Vitals Min/Max Last 24 Hours:  Temp  Min: 35.9 °C (96.6 °F)  Max: 37 °C (98.6 °F)  Pulse  Min: 9  Max: 109  Resp  Min: 16  Max: 18  BP  Min: 86/47  Max:  133/69  MAP (mmHg)  Min: 61  Max: 88    Intake/Output:     Intake/Output Summary (Last 24 hours) at 8/3/2024 0556  Last data filed at 8/2/2024 1925  Gross per 24 hour   Intake 356.9 ml   Output 1934 ml   Net -1577.1 ml       Physical Exam:  General: Examination reveals a well developed, well nourished, female, in no acute distress. She is alert and cooperative.  HEENT: PERRLA. External ears normal. Nose normal, no erythema or discharge. Mouth and throat clear.  Fundus: firm.  Perineum: well approximated.  Extremities: no redness or tenderness in the calves or thighs, no edema.  Neurological: alert, oriented, normal speech, no focal findings or movement disorder noted.  Psychological: awake and alert; oriented to person, place, and time.    Lab Data:  Results from last 7 days   Lab Units 08/02/24  1450 08/02/24  0801 08/02/24  0145   WBC AUTO x10*3/uL 10.7 14.5* 19.0*   HEMOGLOBIN g/dL 8.8* 9.7* 11.8*   HEMATOCRIT % 26.7* 28.2* 35.8*   PLATELETS AUTO x10*3/uL 141* 115* 138*   PROTIME seconds 11.5 12.1 11.9   INR  1.0 1.1 1.1   FIBRINOGEN mg/dL 355 970 535

## 2024-08-04 VITALS
OXYGEN SATURATION: 98 % | TEMPERATURE: 97.3 F | RESPIRATION RATE: 17 BRPM | BODY MASS INDEX: 29.13 KG/M2 | SYSTOLIC BLOOD PRESSURE: 117 MMHG | WEIGHT: 174.82 LBS | DIASTOLIC BLOOD PRESSURE: 76 MMHG | HEIGHT: 65 IN | HEART RATE: 93 BPM

## 2024-08-04 LAB
BLOOD EXPIRATION DATE: NORMAL
DISPENSE STATUS: NORMAL
PRODUCT BLOOD TYPE: 6200
PRODUCT CODE: NORMAL
UNIT ABO: NORMAL
UNIT NUMBER: NORMAL
UNIT RH: NORMAL
UNIT VOLUME: 350
XM INTEP: NORMAL

## 2024-08-04 PROCEDURE — 99239 HOSP IP/OBS DSCHRG MGMT >30: CPT | Performed by: STUDENT IN AN ORGANIZED HEALTH CARE EDUCATION/TRAINING PROGRAM

## 2024-08-04 PROCEDURE — 2500000001 HC RX 250 WO HCPCS SELF ADMINISTERED DRUGS (ALT 637 FOR MEDICARE OP)

## 2024-08-04 RX ORDER — POLYETHYLENE GLYCOL 3350 17 G/17G
17 POWDER, FOR SOLUTION ORAL 2 TIMES DAILY PRN
Qty: 14 EACH | Refills: 0 | Status: SHIPPED | OUTPATIENT
Start: 2024-08-04

## 2024-08-04 RX ORDER — FERROUS SULFATE 325(65) MG
65 TABLET ORAL
Qty: 30 TABLET | Refills: 1 | Status: SHIPPED | OUTPATIENT
Start: 2024-08-04 | End: 2024-10-03

## 2024-08-04 ASSESSMENT — PAIN SCALES - GENERAL
PAINLEVEL_OUTOF10: 0 - NO PAIN
PAINLEVEL_OUTOF10: 2
PAINLEVEL_OUTOF10: 4
PAINLEVEL_OUTOF10: 4

## 2024-08-04 ASSESSMENT — PAIN DESCRIPTION - LOCATION
LOCATION: BACK
LOCATION: BACK

## 2024-08-04 NOTE — LACTATION NOTE
Lactation Consultant Note  Lactation Consultation  Reason for Consult: Follow-up assessment  Consultant Name: Anushka Anderson RN, IBCLC    Maternal Information       Maternal Assessment  Breast Assessment: Full, Medium, Plugged duct(s), Other (Comment) (right breast- upper outer aspect (11 o'clock location) of breast there is a start of a plugged duct- reviewed warm compresses, breast massage, breast gymnastics, ect. and S?S of Matititis PI SHEET given)  Nipple Assessment: Intact, Erect, Sore  Alterations in Nipple Condition: Stage I - pain or irritation with no skin break down  Areola Assessment: Normal    Infant Assessment  Infant Behavior: Deep sleep, Content after feeding    Feeding Assessment  Nutrition Source: Breastmilk  Feeding Method: Nursing at the breast  Unable to assess infant feeding at this time: Other (Comment) (baby just fed at the breast on the right breast- did not latch to the second side)    LATCH TOOL       Breast Pump  Pump: Hospital grade electric pump, Double breast pumping  Frequency: Other (comment) (only pump if the baby does not latch to the breast. (to avoid over supply ))  Duration: Initiate phase  Breast Shield Size and Type: 21 mm  Units of Volume: mL per session    Other OB Lactation Tools       Patient Follow-up  Outpatient Lactation Follow-up: Recommended    Other OB Lactation Documentation  Maternal Risk Factors: Hypertension  Infant Risk Factors: Early term birth 37-39 weeks    Recommendations/Summary  I did not view a latch at this time. Mom recently fed the baby at the breast (with bedside Rn assistance). Mom's milk is coming in and she has a lump -plugged duct on the right breast (@ 11 o'clock location); reviewed warm compresses, breast massage, breast gymnastics, ect. and S/S of Matititis PI SHEET given   Since mom did not latch the baby to the second breast (since around 3300 and it is full; I did advise her to pump only this breast to soften, but, do not pump the  breast the baby was just on.     Reviewed feeding cues, breat feeding on demand, output on different days of life, average percentage of weight loss, milk production/ supply, and the other breastfeeding education topics.      Encouraged mom to breastfeed on demand with a goal of 8-12 feeds in a 24 hour period.   If baby is not showing feeding cues and it has been 3 hours since the last time the baby was fed or the last time the baby attempted to feed, encouraged her to place baby in skin to skin.    If the baby does not latch to the breast; she needs to pump for 20 minutes. But, if baby is latching to the breast well and feeding; do not pump.   Mom can also use the pump for a few minutes to soften the breast a little and then latch if she is too engorged to get the baby to latch initially.   Reviewed different scenarios with parents and they verbalized understanding.     Mom has a breast pump for at home.   Warm packs, cold packs, sterilization bag, PI sheet for plugged ducts/ Mastitis given to patient. I also gave the [facility] breast feeding network emergency phone number for middle of the night needed assistance.     Encouraged her to utilize the outpatient lactation resources, if needed.   Contact information given.   [phone] [facility] or [phone] Hu      Questions answered.

## 2024-08-04 NOTE — DISCHARGE SUMMARY
7/22/20, 1:22 PM EDT  DISCHARGE PLANNING EVALUATION:    Zbigniew Trimble       Admitted from: ED 7/21/2020/ [address] day: 1   Location: [address] [place] [address] Reason for admit: Hyponatremia [E87.1] Status: IP  Admit order signed?: no. Sticky note left for physician. PMH:  has a past medical history of Asthma, Diabetes mellitus ([address] and Gout. Procedure: none  Pertinent abnormal Imaging:MRI Brain W WO Contrast   Impression:              1. 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 There is mild heterogeneous intrinsic hyperintense T1 signal within the lesion suggesting possible hemorrhagic component. This causes mass effect on the left cavernous   sinus without evidence of invasion. Superiorly the lesion contacts the optic chiasm. 2. Moderate to severe sinusitis. CTA Head W WO Contrast   Impression:          There is enlargement of the suprasellar cistern and pituitary gland measuring 1.6 cm concerning for pituitary lesion possible macroadenoma. There is mild mass effect along the cavernous sinus on the left which may be contributing to patient's   symptoms. Contrast-enhanced MRI is advised for further evaluation. Medications:  Scheduled Meds:   insulin lispro  0-6 Units Subcutaneous Q6H    sodium chloride  2 g Oral TID WC    allopurinol  300 mg Oral Daily    lisinopril  20 mg Oral Daily    finasteride  5 mg Oral Daily    fluticasone  1 spray Each Nostril Daily    budesonide-formoterol  2 puff Inhalation BID    atorvastatin  10 mg Oral Nightly    tamsulosin  0.4 mg Oral Nightly    sodium chloride flush  10 mL Intravenous 2 times per day    [Held by provider] enoxaparin  40 mg Subcutaneous Daily     Continuous Infusions:   dextrose        Pertinent Info/Orders/Treatment Plan:  Na 119, WBC 12.2, COVID (-), telemetry, Neurosurgery , Neurology consult, neuro checks, diabetes management, Nephrology consult.   Diet: DIET CARB CONTROL; Daily Fluid Restriction: 1200 Postpartum Discharge Summary    Admission Date: 2024  Discharge Date: 24     Discharge Diagnosis  Problem List Items Addressed This Visit    None  Visit Diagnoses        (spontaneous vaginal delivery) ([facility])    -  Primary    Relevant Medications    polyethylene glycol (Glycolax, Miralax) 17 gram packet    ferrous sulfate, 325 mg ferrous sulfate, tablet             Norah Khan is a 38 y.o. female now  and postpartum day 3      Pregnancy notable for:  - s/p BMZ -17  - Low lying placenta, dx at 19 wks, was no longer low lying at 33 wks. 3cm from os on  MR  - vaginal varicosities, MRI pelvis on   - hx of postpartum HTN, takes bASA 162 mg    Hospital Course  Admitted for IOL in s/o placental abruption  Delivery Date: 2024 11:24 PM  Delivery type: Vaginal, Spontaneous   GA at delivery: 38w0d  Outcome: Living  Anesthesia during delivery: Epidural  Intrapartum complications: Uterine Atony  Feeding method: Breastfeeding Status: Yes     Delivery complications:   Persistent bleeding following  and delivery of placenta. US showing retained POC that was unable to be removed with pitocin, cytotec, methergine, manual extraction. Now POD#2 s/p D&C. Following D&C CAMI was placed. Total QBL from delivery and D&C 1700 mL. She received 1u pRBC and 2u cryoprecipitate. Hgb 12.9 -> 8.8, plt stable, fibrinogen and coags WNL. Rx for PO Fe sent at [facility].     Contraception at discharge: POPs at 6 week PPV     Complications Requiring Follow-Up  Hx of gHTN PP in G1 however normotensive throughout admission. Has BP cuff at home and will check once/day. Reviewed signs/symptoms of SPEC.     Pertinent Subjective and Physical Exam At Time of Discharge  Patient seen at bedside - doing well and no acute events overnight. Pain well-controlled on current regimen, lochia light on pad, voiding spontaneously, passing flatus, ambulating independently, and tolerating PO.     General: well appearing,  ml   Smoking status:  reports that he has never smoked. He has never used smokeless tobacco.   PCP: Jarret Alfaro DO  Readmission 30 days or less: no  Readmission Risk Score: 15%    Discharge Planning Evaluation  Current Residence:  Private Residence  Living Arrangements:  Alone   Support Systems:  Family Members  Current Services PTA:     Potential Assistance Needed:  N/A  Potential Assistance Purchasing Medications:  No  Does patient want to participate in local refill/ meds to beds program?  No  Type of Home Care Services:  None  Patient expects to be discharged to:  Home  Expected Discharge date:  07/29/20  Follow Up Appointment: Best Day/ Time: Monday AM    Patient Goals/Plan/Treatment Preferences: Met with Ronn Bear. He currently lives at home alone. Plan is to return at discharge. He denies need for DME and declines HH. States he has family support. Transportation/Food Security/Housekeeping Addressed:  No issues identified.     Evaluation: no well-nourished, postpartum  Obstetric: abdomen soft/non-tender, fundus firm below umbilicus, lochia light  Skin: No rashes/lesions/erythema  Neuro: A/Ox3, conversational  GI: +flatus  Respiratory: Even and unlabored on RA  Extremities: No edema, discoloration, or pain in BLE, equal and palpable DP and PT pulses    Psych: appropriate mood and affect     Discharge Meds     Your medication list        START taking these medications        Instructions Last Dose Given Next Dose Due   ferrous sulfate (325 mg ferrous sulfate) tablet      Take 1 tablet by mouth once daily with breakfast.       polyethylene glycol 17 gram packet  Commonly known as: Glycolax, Miralax      Take 17 g by mouth 2 times a day as needed (first line).              CONTINUE taking these medications        Instructions Last Dose Given Next Dose Due   FLUoxetine 60 mg tablet  Commonly known as: PROzac      Take 1 tablet (60 mg) by mouth once daily.       Prenatabs FA 29-1 mg tablet  Generic drug: prenatal vit,calc78-iron-folic                  STOP taking these medications      aspirin 81 mg EC tablet                  Where to Get Your Medications        These medications were sent to [facility]/pharmacy #[id number] - [facility] [address] [facility]  [address] [facility] [address]      Phone: [phone]   ferrous sulfate (325 mg ferrous sulfate) tablet  polyethylene glycol 17 gram packet          Test Results Pending At Discharge  Pending Labs       Order Current Status    Surgical Pathology Exam - PLACENTA Collected (08/01/24 7055)            Outpatient Follow-Up  Future Appointments   Date Time Provider Department Center   8/29/2024 10:00 AM Yoko Singh, PhD [id number] [place]   9/26/2024 10:00 AM Yoko Singh, PhD [id number] [facility]   9/30/2024  8:00 AM Carlie France MD [id number] [facility]   11/21/2024  9:00 AM Yoko Singh, PhD [id number] [place]      order placed to schedule 4-6 weeks for routine postpartum visit  with   Jass.    I spent 35 minutes in the professional and overall care of this patient.      Karolina Ramos PA-C  08/04/24 1:01 PM  [place]

## 2024-08-09 ENCOUNTER — APPOINTMENT (OUTPATIENT)
Dept: OBSTETRICS AND GYNECOLOGY | Facility: CLINIC | Age: 39
End: 2024-08-09
Payer: COMMERCIAL

## 2024-08-15 LAB
LABORATORY COMMENT REPORT: NORMAL
PATH REPORT.FINAL DX SPEC: NORMAL
PATH REPORT.GROSS SPEC: NORMAL
PATH REPORT.RELEVANT HX SPEC: NORMAL
PATH REPORT.TOTAL CANCER: NORMAL

## 2024-08-16 ENCOUNTER — APPOINTMENT (OUTPATIENT)
Dept: OBSTETRICS AND GYNECOLOGY | Facility: CLINIC | Age: 39
End: 2024-08-16
Payer: COMMERCIAL

## 2024-08-19 ENCOUNTER — APPOINTMENT (OUTPATIENT)
Dept: OBSTETRICS AND GYNECOLOGY | Facility: CLINIC | Age: 39
End: 2024-08-19
Payer: COMMERCIAL

## 2024-08-20 ENCOUNTER — LACTATION CONSULT (OUTPATIENT)
Dept: LACTATION | Facility: CLINIC | Age: 39
End: 2024-08-20
Payer: COMMERCIAL

## 2024-08-20 DIAGNOSIS — O92.70 LACTATION DISORDER (HHS-HCC): Primary | ICD-10-CM

## 2024-08-20 PROCEDURE — 99211 OFF/OP EST MAY X REQ PHY/QHP: CPT | Performed by: OBSTETRICS & GYNECOLOGY

## 2024-08-20 ASSESSMENT — ENCOUNTER SYMPTOMS
DEPRESSION: 0
LOSS OF SENSATION IN FEET: 0
OCCASIONAL FEELINGS OF UNSTEADINESS: 0

## 2024-08-20 NOTE — PATIENT INSTRUCTIONS
PATIENT DISCUSSION SUMMARY:  .  .  Mother and infant seen for concerns over latch. Latch was painful but is slowly getting more comfortable.    Mother has been feeding on demand about every 3 hours.   Mother's milk in and milk easily expressible.      Infant weight gain  good, gain 41 g per day since pediatric visit     .  Infant alert with moist mucous membranes.  Wet diaper in office.    Mother's nipples erect and breasts soft.  Infant able to sustain latch.  Mother reports latch as comfortable, although deepest latch achieved is still shallow,. Nipples with slight flattening after feeding.     Suck assessment abnormal: reveals infant with tight maxillary frenulum, and curls upper lip, but lip able to be everted.  Infant with good cupping and extension of tongue, and good lateralization at this exam.  Infant not noted to elevate tongue past mid mouth on this exam.  Palate high and intact to palpation. Infat turning head more easily and farther to right thant to left.    Discussed to bottle feed with more upright positioning and evy lips to help infant have wide latch when using bottle by pulling down on chin and everting upper lip.  Mother instructed on suck training exercises.    Pumping session observed with Spectra S2     pump with  28    mm flanges.  Discussed using significantly decreased flange size for improved fit.. Discussed increasing vacuum to comfort, use of massage and compression, and hand expression after pumping to improve breast emptying.  Mother shown technique for hand expression. Pumping out approximately   4 oz  in 10   minutes.    Reviewed post birth warning signs and safe sleep    Plan:  Mother to do suck training exercises prior to every feed.  Feed at least 8-12 times daily, both breasts for as long as infant is actively sucking and swallowing.  Use massage and compression to aid transfer.      If infant not sustaining latch with good sucks and audible swallows, mother to pump both  breasts at same time for 15-20 minutes, using massage and compression, with hand expression after to fully drain breast. Feed expressed milk to infant until satisfied.    May pump as desired to save milk for return to work.     If suck training exercises do not improve latch, infant needs to be evaluated by ENT or pediatric dentist for tight posterior frenulum, and to evaluate maxillary frenulum.    Will f/u with lactation as needed and with pediatrician for 2 month visit.    Denies questions.  LACTATION PROBLEMS AND STRATEGIES:  Problems latching baby to breast: Baby should latch to areola (dark area) not just the nipple.  Baby's lips should be flanged outward.  Bring the baby up to the level of your breast by putting a pillow under the baby.  Dribble milk over the nipple or express milk so that baby can taste it  Encourage a deeper latch with the asymetrical latch- lining baby's nose opposite mother's nipple.  Gently tickle your baby's lip with your nipple to encourage your baby to open his/her mouth wide.  Hold baby so that your breast is positioned deep in the baby's mouth.  Hold your baby close, tummy to tummy.  If baby does not latch to breast, express breast milk.  If the baby is not latched on well, break seal and gently try again.  Keep baby skin to skin and watch for feeding cues.  Massage breast to start milk-ejection reflex.  Place nipple and at least 1 inch of areola into baby's mouth.  Place your hand behind the baby's neck and shoulder.  Do not force baby's head into breast.  Put baby in the football hold or clutch hold, supporting his/her neck and head in sniffing position to open his/her throat.  Shape breast into oval shape (sandwich hold)  for deep latch.  Try different feeding positions (cradle, football, side etc.).  Use a breast feeding pillow to bring baby up to the level of the breast.  Use semi-reclined feeding position to allow baby to take an active role and trigger baby's inborn feeding  "behavior.  Use your hand to support your breast during feeding.  When your baby's mouth is wide open, quickly pull baby into your breast.  Your baby's chin should be pressed into your breast.  Pumping pointers: Air dry nipples, express milk and rub in or use an approved nipple cream after expressing., Apply heat to breasts before pumping., Choose a familiar comfortable place to express milk., If nipples are sore use less pressure and pump more frequently for shorter times., Listen to a tape of baby while pumping., Look at a photo of baby wile expressing., Massage breasts before and during pumping., Minimize distractions., Moisten flange before beginning to improve seal., Relax for 5 minutes before pumping., Stimulate the nipples and hand express a few drops before pumping., and Use pumping bra/band for \"hands free\" pumping.  PATIENT INSTRUCTION HANDOUTS GIVEN:   Tips for pumping with an electric breast pump and milk storage for your healthy baby (PI# 123)  Suck training (PI# 176)  How to keep your breast pump kit clean  Foods that promote good milk production  Breastfeeding: Tips to increase your milk supply (PI# 162)  Breastfeeding: Sore and cracked nipples (PI# 167)  Breastfeeding: Plugged milk ducts/mastitis (PI# 164)  Breast massage with milk expression by hand (PI# 728)  LACTATION EDUCATION:  Correct Positioning, Correct Latch On, and Demo use of Pump       "

## 2024-08-20 NOTE — PROGRESS NOTES
Lactation Counseling Note    Subjective:    Norah Khan is a 38 y.o. patient referred for lactation counseling. She is here today due to Latch issues. She was referred by her  self .     OB HISTORY:   Healthcare Provider: OB/GYN [facility]  Prenatal Screening: Abnormal  Placenta previa, excessive bleeding tear  Maternal Blood Type: A positive  /Para: : 2  Para: 2  Weeks Gestation: 38  Mode of Delivery: Normal vaginal route  Induction/Augmentation  Epidural/General Anesthesia  Manual removal of placenta  Delivery Complications: needed some blow by O2  Maternal Complications: retained placenta with manual removal of placenta, EBL 1700, transfused with 1 unit RBC and 2 units cryoprecipitate  [place] Information: : Emmanuel Khan 26  MRN: [id number]  Place of Birth: [facility]  Healthcare Provider: Enrique  APGARS: 1 minute: 6  5 minutes: 8  Skin to skin contact: Delayed due to retained placenta  Infant's blood type: not tested  Birth parameters: AGA  Birth weight: 3317 gm  Birth length: 53.3 cm  Discharge weight: 3135 gm  Complications: sore latch  at first  No other concerns    Objective:    BREASTFEEDING ASSESSMENT:   Breast Assessment: Large, Symmetrical, Soft, and Compressible  Nipple Assessment/Stage: intact, erect, and distorted shape or flattened nontender  Areola: Normal  Feeding Position: breast sandwich, cross - cradle, skin to skin, both sides, mother demonstrates good positioning, mother needs assistance with latch/positioning, and baby's head too high over breast  Latch: minimal assistance is needed, instructed on deep latch, eagerly grasped on to latch, shallow latch, mouth not open wide enough, comfortable with no pain, sucking and swallowing, sucks with long jaw movement, chin and lower lip contact breast first, bursts of sucking, swallowing, and rest, upper lip turned in, flanged lips, chin moves in rhythmic motion, and frequent audible swallows  Suck/Feeding: sustained, coordinated  suck/swallow/breathe, baby led rhythmically, audible swallowing, and cheeks dimple during sucking  Alternative Feeding Methods: nursing at the breast and feeding expressed breast milk  Feeding and Cleaning instructions reviewed for: Paced bottle  Infant Feeding Method: Breast milk only  Infant Behavior: awake, readiness to feed, feeding cues observed, rooting response, and content after feeding  Infant Information: Jaundice  Palate- high/arch/bubble/normal  Poor occlusion of lips around areola  Tongue humped/bunched/retracted/elevated  Good cupping of tongue  Good lateral movement of the tongue  Fontanel: flat  Mucous Membranes: Moist  Breast Pain: Pain scale 0-10 (10 most pain): 0  Nipple Pain: Pain scale 0-10 (10 most pain): 0  Other pain relief methods: cy  Weight: Reported pediatrician visit weight: 3515 gm  Date of pediatrician weight: 8/15/24  Weight before feedin gm  Weight after feedin gm  Amount of breast milk transferred: 103 ml  Number of voids in the last 24 hours: 8  Number of stools in the last 24 hours: 6  No other concerns        PATIENT DISCUSSION SUMMARY:  .  .  Mother and infant seen for concerns over latch. Latch was painful but is slowly getting more comfortable.    Mother has been feeding on demand about every 3 hours.   Mother's milk in and milk easily expressible.      Infant weight gain  good, gain 41 g per day since pediatric visit     .  Infant alert with moist mucous membranes.  Wet diaper in office.    Mother's nipples erect and breasts soft.  Infant able to sustain latch.  Mother reports latch as comfortable, although deepest latch achieved is still shallow,. Nipples with slight flattening after feeding.     Suck assessment abnormal: reveals infant with tight maxillary frenulum, and curls upper lip, but lip able to be everted.  Infant with good cupping and extension of tongue, and good lateralization at this exam.  Infant not noted to elevate tongue past mid mouth  on this exam.  Palate high and intact to palpation. Infat turning head more easily and farther to right thant to left.    Discussed to bottle feed with more upright positioning and evy lips to help infant have wide latch when using bottle by pulling down on chin and everting upper lip.  Mother instructed on suck training exercises.    Pumping session observed with Spectra S2     pump with  28    mm flanges.  Discussed using significantly decreased flange size for improved fit.. Discussed increasing vacuum to comfort, use of massage and compression, and hand expression after pumping to improve breast emptying.  Mother shown technique for hand expression. Pumping out approximately   4 oz  in 10   minutes.    Reviewed post birth warning signs and safe sleep    Plan:  Mother to do suck training exercises prior to every feed.  Feed at least 8-12 times daily, both breasts for as long as infant is actively sucking and swallowing.  Use massage and compression to aid transfer.      If infant not sustaining latch with good sucks and audible swallows, mother to pump both breasts at same time for 15-20 minutes, using massage and compression, with hand expression after to fully drain breast. Feed expressed milk to infant until satisfied.    May pump as desired to save milk for return to work.     If suck training exercises do not improve latch, infant needs to be evaluated by ENT or pediatric dentist for tight posterior frenulum, and to evaluate maxillary frenulum.    Will f/u with lactation as needed and with pediatrician for 2 month visit.    Denies questions.  LACTATION PROBLEMS AND STRATEGIES:  Problems latching baby to breast: Baby should latch to areola (dark area) not just the nipple.  Baby's lips should be flanged outward.  Bring the baby up to the level of your breast by putting a pillow under the baby.  Dribble milk over the nipple or express milk so that baby can taste it  Encourage a deeper latch with the  "asymetrical latch- lining baby's nose opposite mother's nipple.  Gently tickle your baby's lip with your nipple to encourage your baby to open his/her mouth wide.  Hold baby so that your breast is positioned deep in the baby's mouth.  Hold your baby close, tummy to tummy.  If baby does not latch to breast, express breast milk.  If the baby is not latched on well, break seal and gently try again.  Keep baby skin to skin and watch for feeding cues.  Massage breast to start milk-ejection reflex.  Place nipple and at least 1 inch of areola into baby's mouth.  Place your hand behind the baby's neck and shoulder.  Do not force baby's head into breast.  Put baby in the football hold or clutch hold, supporting his/her neck and head in sniffing position to open his/her throat.  Shape breast into oval shape (sandwich hold)  for deep latch.  Try different feeding positions (cradle, football, side etc.).  Use a breast feeding pillow to bring baby up to the level of the breast.  Use semi-reclined feeding position to allow baby to take an active role and trigger baby's inborn feeding behavior.  Use your hand to support your breast during feeding.  When your baby's mouth is wide open, quickly pull baby into your breast.  Your baby's chin should be pressed into your breast.  Pumping pointers: Air dry nipples, express milk and rub in or use an approved nipple cream after expressing., Apply heat to breasts before pumping., Choose a familiar comfortable place to express milk., If nipples are sore use less pressure and pump more frequently for shorter times., Listen to a tape of baby while pumping., Look at a photo of baby wile expressing., Massage breasts before and during pumping., Minimize distractions., Moisten flange before beginning to improve seal., Relax for 5 minutes before pumping., Stimulate the nipples and hand express a few drops before pumping., and Use pumping bra/band for \"hands free\" pumping.  PATIENT INSTRUCTION " HANDOUTS GIVEN:   Tips for pumping with an electric breast pump and milk storage for your healthy baby (PI# 123)  Suck training (PI# 176)  How to keep your breast pump kit clean  Foods that promote good milk production  Breastfeeding: Tips to increase your milk supply (PI# 162)  Breastfeeding: Sore and cracked nipples (PI# 167)  Breastfeeding: Plugged milk ducts/mastitis (PI# 164)  Breast massage with milk expression by hand (PI# 728)  LACTATION EDUCATION:  Correct Positioning, Correct Latch On, and Demo use of Pump

## 2024-08-22 ENCOUNTER — TELEPHONE (OUTPATIENT)
Dept: LACTATION | Facility: CLINIC | Age: 39
End: 2024-08-22
Payer: COMMERCIAL

## 2024-08-22 NOTE — LACTATION NOTE
Follow up phone call for lactation visit. Left message for patient to call IBCLC at [phone] to discuss progress or concerns.

## 2024-08-23 ENCOUNTER — TELEPHONE (OUTPATIENT)
Dept: LACTATION | Facility: CLINIC | Age: 39
End: 2024-08-23
Payer: COMMERCIAL

## 2024-08-23 ENCOUNTER — APPOINTMENT (OUTPATIENT)
Dept: OBSTETRICS AND GYNECOLOGY | Facility: CLINIC | Age: 39
End: 2024-08-23
Payer: COMMERCIAL

## 2024-08-23 NOTE — LACTATION NOTE
Follow up phone call for lactation visit. Feels like baby staying on longer, has been doing suck training, still struggling on left. Discussed trying football position on left. Scheduled lactation appointment 88/28/24 to reevaluate.

## 2024-08-27 ENCOUNTER — DOCUMENTATION (OUTPATIENT)
Dept: OBSTETRICS AND GYNECOLOGY | Facility: CLINIC | Age: 39
End: 2024-08-27
Payer: COMMERCIAL

## 2024-08-28 ENCOUNTER — LACTATION CONSULT (OUTPATIENT)
Dept: LACTATION | Facility: CLINIC | Age: 39
End: 2024-08-28
Payer: COMMERCIAL

## 2024-08-28 DIAGNOSIS — O92.70 LACTATION DISORDER (HHS-HCC): Primary | ICD-10-CM

## 2024-08-28 PROCEDURE — 99211 OFF/OP EST MAY X REQ PHY/QHP: CPT | Performed by: OBSTETRICS & GYNECOLOGY

## 2024-08-28 ASSESSMENT — ENCOUNTER SYMPTOMS
DEPRESSION: 0
OCCASIONAL FEELINGS OF UNSTEADINESS: 0
LOSS OF SENSATION IN FEET: 0

## 2024-08-28 NOTE — PATIENT INSTRUCTIONS
PATIENT DISCUSSION SUMMARY:  .  .  Mother and infant seen for concerns over latch. Latch was painful but is now comfortable. Mother has been feeding at breast with occasional bottle of expressed milk.    Mother has been feeding on demand about every 3 hours.   Mother's milk in and milk easily expressible.       Infant weight gain  good, gain 49 g per day since last lactation visit.  Infant alert with moist mucous membranes.  Wet  and messy diaper in office.     Mother's nipples erect and breasts soft.  Infant able to sustain latch.  Mother reports latch as comfortable, although deepest latch achieved is still shallow,. Nipples rounded after feeding.     Suck assessment abnormal: reveals infant with tight maxillary frenulum, and curls upper lip, but lip able to be everted.  Infant with good cupping and extension of tongue, and good lateralization at this exam.  Infant not noted to elevate tongue past mid mouth on this exam.  Palate high and intact to palpation.      Discussed to bottle feed with more upright positioning and evy lips to help infant have wide latch when using bottle by pulling down on chin and everting upper lip.    Mother instructed on suck training exercises.     Pumping session observed with Spectra S2     pump with  20  mm flanges.  Flanges still slightly too large, but significantly improved.  Discussed increasing vacuum to comfort, use of massage and compression, and hand expression after pumping to improve breast emptying.  Pumping out approximately   5 oz  in 10   minutes.     Reviewed post birth warning signs and safe sleep     Plan:  Feed at least 8-12 times daily, both breasts for as long as infant is actively sucking and swallowing.  Use massage and compression to aid transfer.       If infant not sustaining latch with good sucks and audible swallows, mother to pump both breasts at same time for 15-20 minutes, using massage and compression, with hand expression after to fully drain  breast. Feed expressed milk to infant until satisfied.     May pump as desired to save milk for return to work.      If feeding concerns arise, infant needs to be evaluated by ENT or pediatric dentist for tight posterior frenulum, and to evaluate maxillary frenulum.     Will f/u with lactation as needed and with pediatrician for 2 month visit.     Denies questions.  LACTATION PROBLEMS AND STRATEGIES:  Problems latching baby to breast: Baby should latch to areola (dark area) not just the nipple.  Baby's lips should be flanged outward.  Bring the baby up to the level of your breast by putting a pillow under the baby.  Encourage a deeper latch with the asymetrical latch- lining baby's nose opposite mother's nipple.  Gently tickle your baby's lip with your nipple to encourage your baby to open his/her mouth wide.  Hold baby so that your breast is positioned deep in the baby's mouth.  Hold your baby close, tummy to tummy.  If the baby is not latched on well, break seal and gently try again.  Keep baby skin to skin and watch for feeding cues.  Massage breast to start milk-ejection reflex.  Place nipple and at least 1 inch of areola into baby's mouth.  Place your hand behind the baby's neck and shoulder.  Do not force baby's head into breast.  Put baby in the football hold or clutch hold, supporting his/her neck and head in sniffing position to open his/her throat.  Shape breast into oval shape (sandwich hold)  for deep latch.  Try different feeding positions (cradle, football, side etc.).  Use a breast feeding pillow to bring baby up to the level of the breast.  Use semi-reclined feeding position to allow baby to take an active role and trigger baby's inborn feeding behavior.  Use your hand to support your breast during feeding.  When your baby's mouth is wide open, quickly pull baby into your breast.  Your baby's chin should be pressed into your breast.  Pumping pointers: Air dry nipples, express milk and rub in or use  "an approved nipple cream after expressing., Apply heat to breasts before pumping., Choose a familiar comfortable place to express milk., If nipples are sore use less pressure and pump more frequently for shorter times., Listen to a tape of baby while pumping., Look at a photo of baby wile expressing., Massage breasts before and during pumping., Minimize distractions., Moisten flange before beginning to improve seal., Pick a good time of day to begin (early in a.m., during the baby's long sleep stretch, when skipping a feeding, etc.)., Relax for 5 minutes before pumping., Stimulate the nipples and hand express a few drops before pumping., and Use pumping bra/band for \"hands free\" pumping.  PATIENT INSTRUCTION HANDOUTS GIVEN:   None given  LACTATION EDUCATION:  Correct Positioning, Correct Latch On, and Demo use of Pump       "

## 2024-08-28 NOTE — PROGRESS NOTES
Lactation Counseling Note    Subjective:    Norah Khan is a 38 y.o. patient referred for lactation counseling. She is here today due to Latch issues. She was referred by her  self .     OB HISTORY:   Healthcare Provider: [facility]   Information: Baby's Name: Emmanuel Khan  : 24  MRN: [id number]  Healthcare Provider: Enrique  Birth weight: 3317 gm    Objective:    BREASTFEEDING ASSESSMENT:   Breast Assessment: Large, Symmetrical, Soft, and Compressible  Nipple Assessment/Stage: intact, erect, and rounded after feeding nontender  Areola: Normal  Feeding Position: breast sandwich, cross - cradle, football/seated, both sides, and mother needs assistance with latch/positioning  Latch: minimal assistance is needed, instructed on deep latch, eagerly grasped on to latch, shallow latch, mouth not open wide enough, comfortable with no pain, sucking and swallowing, sucks with long jaw movement, bursts of sucking, swallowing, and rest, upper lip turned in, flanged lips, comfortable latch, and frequent audible swallows  Suck/Feeding: sustained, coordinated suck/swallow/breathe, baby led rhythmically, and audible swallowing  Alternative Feeding Methods: nursing at the breast, feeding expressed breast milk, and paced bottle  Feeding and Cleaning instructions reviewed for: Paced bottle  Infant Feeding Method: Breast milk only  Infant Behavior: awake, quiet alert, readiness to feed, feeding cues observed, rooting response, and sucking  Infant Information: Palate- high/arch/bubble/normal  Poor occlusion of lips around areola  Tongue protrudes over alveolar ridge  Good cupping of tongue  Good lateral movement of the tongue  Fontanel: flat  Mucous Membranes: moist  No other concerns  Breast Pain: Pain scale 0-10 (10 most pain): 0  Nipple Pain: Pain scale 0-10 (10 most pain): 0  Weight: Reported pediatrician visit weight: 3515 gm  Date of pediatrician weight: 8/15/24  Weight before feedin gm  Weight  after feedin gm  Amount of breast milk transferred: 81 ml  Number of voids in the last 24 hours: 8  Number of stools in the last 24 hours: 6  No other concerns        PATIENT DISCUSSION SUMMARY:  .  .  Mother and infant seen for concerns over latch. Latch was painful but is now comfortable. Mother has been feeding at breast with occasional bottle of expressed milk.    Mother has been feeding on demand about every 3 hours.   Mother's milk in and milk easily expressible.       Infant weight gain  good, gain 49 g per day since last lactation visit.  Infant alert with moist mucous membranes.  Wet  and messy diaper in office.     Mother's nipples erect and breasts soft.  Infant able to sustain latch.  Mother reports latch as comfortable, although deepest latch achieved is still shallow,. Nipples rounded after feeding.     Suck assessment abnormal: reveals infant with tight maxillary frenulum, and curls upper lip, but lip able to be everted.  Infant with good cupping and extension of tongue, and good lateralization at this exam.  Infant not noted to elevate tongue past mid mouth on this exam.  Palate high and intact to palpation.      Discussed to bottle feed with more upright positioning and evy lips to help infant have wide latch when using bottle by pulling down on chin and everting upper lip.    Mother instructed on suck training exercises.     Pumping session observed with Spectra S2     pump with  20  mm flanges.  Flanges still slightly too large, but significantly improved.  Discussed increasing vacuum to comfort, use of massage and compression, and hand expression after pumping to improve breast emptying.  Pumping out approximately   5 oz  in 10   minutes.     Reviewed post birth warning signs and safe sleep     Plan:  Feed at least 8-12 times daily, both breasts for as long as infant is actively sucking and swallowing.  Use massage and compression to aid transfer.       If infant not sustaining latch  with good sucks and audible swallows, mother to pump both breasts at same time for 15-20 minutes, using massage and compression, with hand expression after to fully drain breast. Feed expressed milk to infant until satisfied.     May pump as desired to save milk for return to work.      If feeding concerns arise, infant needs to be evaluated by ENT or pediatric dentist for tight posterior frenulum, and to evaluate maxillary frenulum.     Will f/u with lactation as needed and with pediatrician for 2 month visit.     Denies questions.  LACTATION PROBLEMS AND STRATEGIES:  Problems latching baby to breast: Baby should latch to areola (dark area) not just the nipple.  Baby's lips should be flanged outward.  Bring the baby up to the level of your breast by putting a pillow under the baby.  Encourage a deeper latch with the asymetrical latch- lining baby's nose opposite mother's nipple.  Gently tickle your baby's lip with your nipple to encourage your baby to open his/her mouth wide.  Hold baby so that your breast is positioned deep in the baby's mouth.  Hold your baby close, tummy to tummy.  If the baby is not latched on well, break seal and gently try again.  Keep baby skin to skin and watch for feeding cues.  Massage breast to start milk-ejection reflex.  Place nipple and at least 1 inch of areola into baby's mouth.  Place your hand behind the baby's neck and shoulder.  Do not force baby's head into breast.  Put baby in the football hold or clutch hold, supporting his/her neck and head in sniffing position to open his/her throat.  Shape breast into oval shape (sandwich hold)  for deep latch.  Try different feeding positions (cradle, football, side etc.).  Use a breast feeding pillow to bring baby up to the level of the breast.  Use semi-reclined feeding position to allow baby to take an active role and trigger baby's inborn feeding behavior.  Use your hand to support your breast during feeding.  When your baby's mouth  "is wide open, quickly pull baby into your breast.  Your baby's chin should be pressed into your breast.  Pumping pointers: Air dry nipples, express milk and rub in or use an approved nipple cream after expressing., Apply heat to breasts before pumping., Choose a familiar comfortable place to express milk., If nipples are sore use less pressure and pump more frequently for shorter times., Listen to a tape of baby while pumping., Look at a photo of baby wile expressing., Massage breasts before and during pumping., Minimize distractions., Moisten flange before beginning to improve seal., Pick a good time of day to begin (early in a.m., during the baby's long sleep stretch, when skipping a feeding, etc.)., Relax for 5 minutes before pumping., Stimulate the nipples and hand express a few drops before pumping., and Use pumping bra/band for \"hands free\" pumping.  PATIENT INSTRUCTION HANDOUTS GIVEN:   None given  LACTATION EDUCATION:  Correct Positioning, Correct Latch On, and Demo use of Pump       "

## 2024-08-29 ENCOUNTER — TELEMEDICINE (OUTPATIENT)
Dept: BEHAVIORAL HEALTH | Facility: CLINIC | Age: 39
End: 2024-08-29
Payer: COMMERCIAL

## 2024-08-29 ENCOUNTER — OFFICE VISIT (OUTPATIENT)
Dept: BEHAVIORAL HEALTH | Facility: CLINIC | Age: 39
End: 2024-08-29
Payer: COMMERCIAL

## 2024-08-29 DIAGNOSIS — F41.1 GENERALIZED ANXIETY DISORDER: Primary | ICD-10-CM

## 2024-08-29 DIAGNOSIS — F42.8 OBSESSIVE COMPULSIVE NEUROSIS: ICD-10-CM

## 2024-08-29 DIAGNOSIS — F41.1 GENERALIZED ANXIETY DISORDER: ICD-10-CM

## 2024-08-29 PROCEDURE — 90837 PSYTX W PT 60 MINUTES: CPT | Performed by: PSYCHOLOGIST

## 2024-08-29 PROCEDURE — 99214 OFFICE O/P EST MOD 30 MIN: CPT | Performed by: STUDENT IN AN ORGANIZED HEALTH CARE EDUCATION/TRAINING PROGRAM

## 2024-08-29 PROCEDURE — 99214 OFFICE O/P EST MOD 30 MIN: CPT | Mod: GT,AM,95 | Performed by: STUDENT IN AN ORGANIZED HEALTH CARE EDUCATION/TRAINING PROGRAM

## 2024-08-29 RX ORDER — FLUOXETINE HYDROCHLORIDE 40 MG/1
40 CAPSULE ORAL DAILY
Qty: 90 CAPSULE | Refills: 3 | Status: SHIPPED | OUTPATIENT
Start: 2024-08-29 | End: 2025-08-29

## 2024-08-29 RX ORDER — FLUOXETINE HYDROCHLORIDE 20 MG/1
20 CAPSULE ORAL DAILY
Qty: 90 CAPSULE | Refills: 3 | Status: SHIPPED | OUTPATIENT
Start: 2024-08-29 | End: 2025-08-29

## 2024-08-29 NOTE — PROGRESS NOTES
"Subjective    All Individuals Present: Patient and Provider (Encounter Provider)     ID: Norah Khan is a 38 y.o. female with history of OCD and anxiety.     Interval History/HPI/PFSH:  Delivered 8/1/24, Emmanuel, breastfeeding, eating well and growing well.    Feeling more confident, still really tired. Feeling more relaxed about expectations, less stressed by grunting/noises. Less anxious about nursing.    Had heavy bleeding in June, placental rupture @31wga, scary, but felt reassured and glad she lasted until 38wga. Lost a lot of blood with having to have surgery for removal of retained placenta. Recovery has been better than expected, feels like pain well controlled now.  Did require blood transfusion and still taking iron.    Overall feels like her mood and anxiety are stable and under control. Only a few crying episodes.    Has 8 more weeks of maternity leave.  has 1 more week, but will take an additional week when pt transfers back to work.    Denies SI/HI/AVH.       Medication side effects: None Reported     Review of Systems  Constitutional: Positive for fatigue, Negative for fevers and weight loss  Psychiatric: Positive for anxiety and fatigue, Negative for decreased appetite, depression, irritability, loss of interest in favorite activities, mood swings, and sleep disturbance  Neurological: Negative   Other: @4 weeks PP    Objective   LMP 11/09/2023 (Exact Date)   Wt Readings from Last 4 Encounters:   08/01/24 79.3 kg (174 lb 13.2 oz)   07/24/24 80.3 kg (177 lb)   07/19/24 79.6 kg (175 lb 8 oz)   07/08/24 79.9 kg (176 lb 1 oz)       Mental Status Exam  General Appearance: Well groomed, appropriate eye contact.  Attitude/Behavior: Cooperative, conversant, engaged, and with good eye contact.  Motor: No psychomotor agitation or retardation, no tremor or other abnormal movements.  Speech: Normal rate, volume, prosody  Gait/Station: Within normal limits  Mood: \"good\"  Affect: Euthymic, full-range " and Congruent with mood and topic of conversation  Thought Process: Linear, goal directed  Thought Associations: No loosening of associations  Thought Content: normal  Sensorium: Alert and oriented to person, place, time and situation  Insight: Intact, as evidenced by awareness of symptom patterns  Judgment: Intact  Cognition: Cognitively intact to conversational testing with respect to attention, orientation, fund of knowledge, recent and remote memory, and language.  Testing: N/A.    Laboratory/Imaging/Diagnostic Tests       Assessment/Plan   Overall Formulation and Differential Diagnosis:  Norah Khan is a 38 y.o. female who meets criteria for OCD and MELA.  Interval Assessment:  Past psychiatric history of OCD and anxiety with postpartum exacerbation, now 4 weeks postpartum (delivery date 8/1/24) who [facility] for psychiatric follow-up in current pregnancy. She is prescribed Fluoxetine 60mg PO daily for management of symptoms.      3/13/24: OCD symptoms, mood well-controlled. @18wga, some fatigue and nausea with pregnancy. No need for fluoxetine dose change currently, will continue to monitor as pregnancy progresses.    4/10/24: @21wga. Stable, euthymic, OCD symptoms well-controlled. No need for medication changes.    8/29/24: 4 weeks PP, stable and euthymic. No need for changes.     Suicide/Violence Risk Assessment:  Although patient has risk factor of anxiety, patient has multiple protective factors including child-related concerns, treatment adherence, employment, social supports, help-seeking behavior, no prior suicide attempts, no prior history of homicidal violence, no firearms access, absence of substance use disorder, absence of suicidal ideations, absence of homicidal ideations, and future orientation, making the patient's risk of harm to self or others acutely low and chronically low.      Impression:  OCD with peripartum exacerbation  Anxiety disorder, unspecified      PLAN:  - Continue Fluoxetine 60 mg PO daily for OCD and anxiety symptoms. Denies adverse effects. 90 day script with refills available at pt's outpatient pharmacy.   - Continue psychotherapy with Dr. Yoko Singh (exposure-response prevention therapy) as schedule allows, currently seeing monthly  - Provided psychoeducation regarding psychiatric diagnoses, medications, and indicated treatments     FOLLOW-UP: 8 weeks, or sooner if new or worsening symptoms  Risk Assessment:  Imminent Risk of Suicide or Serious Self-Injury: Low Risk -- Risk factors include: Severe anxiety Protective factors include:Denies current suicidal ideation, Denies history of suicide attempts , Future-oriented talk , Willingness to seek help and support , Skills in problem solving, conflict resolution, and nonviolent handling of disputes, Cultural and Yarsani beliefs that discourage suicide and support self-preservation , Access to a variety of clinical interventions , Receiving and engaged in care for mental, physical, and substance use disorders , History of adhering to treatment recommendations and/or prescribed medication regimen , Support through ongoing medical and mental healthcare relationships , Current/history of good response to treatment/meds , Interpersonal relationships and supports, e.g., family, friends, peers, community , and Restricted access to firearms or other lethal means of suicide   Imminent Risk of Violence or Homicide: Low Risk - Risk factors include: No significant risk factors identified on screening. Protective factors include: Lack of known history of harm to others , Lack of known history of violent ideation , Lack of known access to firearms , Sense of community, availability/access to resources and support , Sense of optimism, hope , Interpersonal competence , Affect regulation , Sense of self-efficacy, internal locus of control , and Positive, pro-social family/peer network   Treatment Plan:  There are no  recently modified care plans to display for this patient.      Attestation Statements   Topics (in addition those noted above): N/A - E&M coding by complexity of care. , Diagnostic impressions, Importance of adherence to treatment , Instructions for treatment , Patient education , Risks and benefits of treatments , and Side effects of medications

## 2024-08-29 NOTE — PROGRESS NOTES
"START TIME: 10:07  END TIME: 11  TOTAL TIME FACE TO FACE: 53    Subjective   Patient ID: Winston Khan is a 38 y.o. female who presents for   Problem List Items Addressed This Visit       Generalized anxiety disorder - Primary   .    Virtual or Telephone Consent    An interactive audio and video telecommunication system which permits real time communications between the patient (at the originating site) and provider (at the distant site) was utilized to provide this telehealth service.   Verbal consent was requested and obtained from Winston Khan on this date, 08/29/24 for a telehealth visit.      CONTENT OF SESSION:   This was a followup appointment (session 13) between provider and patient to address symptoms of postpartum anxiety and OCD. winston is still seeing Dr. Walsh for medication management with positive effect.     Focus of session was on Winston's postpartum psychological adjustment.  Winston is about 4 weeks postpartum (with baby boy, \"Aquilino\").  She denied PTSD symptoms, though noted some traumatic experiences with delivery. She reported overall positive adjustment, and improved anxiety and move relative to her previous postpartum experience.  Winston is breastfeeding Aquilino and reported positive nicole with Aquilino. She identified some anxiety triggers associated with parenting and with activity pacing. Provider reviewed psychoeducation on behavioral activation skills and activity pacing. Provider also reviewed psychoeducation on postpartum psychological adjustment. Provider used Socratic questioning to facilitate cognitive restructuring of potential unrealistic or unhelpful cognitions and patient was able to engage in cognitive restructuring. Provider encouraged continued prioritization of sleep and positive social support, when possible. Provider gave supportive counseling and normalized common emotional experiences.    Objective   Social History     Substance and Sexual Activity   Alcohol " Use Not Currently    Alcohol/week: 3.0 standard drinks of alcohol    Types: 3 Standard drinks or equivalent per week      Social History     Social History Narrative    Not on file        Assessment/Plan   Problem List Items Addressed This Visit       Generalized anxiety disorder - Primary     PLAN: Plan made for individual CBT to address OCD on monthly basis. She will continue with Dr. Travis Walsh for medication management.

## 2024-08-30 ENCOUNTER — TELEPHONE (OUTPATIENT)
Dept: LACTATION | Facility: CLINIC | Age: 39
End: 2024-08-30
Payer: COMMERCIAL

## 2024-08-30 NOTE — LACTATION NOTE
Follow up phone call for lactation visit. Left message for patient to call IBCLC at [phone] to discuss progress or concerns.

## 2024-09-16 ENCOUNTER — POSTPARTUM VISIT (OUTPATIENT)
Dept: OBSTETRICS AND GYNECOLOGY | Facility: CLINIC | Age: 39
End: 2024-09-16
Payer: COMMERCIAL

## 2024-09-16 VITALS
HEART RATE: 83 BPM | SYSTOLIC BLOOD PRESSURE: 123 MMHG | WEIGHT: 164 LBS | DIASTOLIC BLOOD PRESSURE: 90 MMHG | BODY MASS INDEX: 27.29 KG/M2

## 2024-09-16 DIAGNOSIS — Z30.011 ENCOUNTER FOR INITIAL PRESCRIPTION OF CONTRACEPTIVE PILLS: ICD-10-CM

## 2024-09-16 DIAGNOSIS — Z23 FLU VACCINE NEED: ICD-10-CM

## 2024-09-16 DIAGNOSIS — R10.2 PELVIC PAIN: ICD-10-CM

## 2024-09-16 PROCEDURE — 99214 OFFICE O/P EST MOD 30 MIN: CPT | Mod: 25 | Performed by: OBSTETRICS & GYNECOLOGY

## 2024-09-16 PROCEDURE — 90471 IMMUNIZATION ADMIN: CPT | Performed by: OBSTETRICS & GYNECOLOGY

## 2024-09-16 RX ORDER — NORETHINDRONE 0.35 MG/1
1 TABLET ORAL DAILY
Qty: 84 TABLET | Refills: 3 | Status: SHIPPED | OUTPATIENT
Start: 2024-09-16 | End: 2025-09-16

## 2024-09-16 ASSESSMENT — EDINBURGH POSTNATAL DEPRESSION SCALE (EPDS)
THE THOUGHT OF HARMING MYSELF HAS OCCURRED TO ME: NEVER
I HAVE BEEN SO UNHAPPY THAT I HAVE BEEN CRYING: NO, NEVER
I HAVE BLAMED MYSELF UNNECESSARILY WHEN THINGS WENT WRONG: YES, SOME OF THE TIME
I HAVE BEEN ABLE TO LAUGH AND SEE THE FUNNY SIDE OF THINGS: AS MUCH AS I ALWAYS COULD
I HAVE FELT SCARED OR PANICKY FOR NO GOOD REASON: NO, NOT AT ALL
I HAVE BEEN SO UNHAPPY THAT I HAVE HAD DIFFICULTY SLEEPING: NOT AT ALL
I HAVE BEEN ANXIOUS OR WORRIED FOR NO GOOD REASON: NO, NOT AT ALL
TOTAL SCORE: 2
THINGS HAVE BEEN GETTING ON TOP OF ME: NO, I HAVE BEEN COPING AS WELL AS EVER
I HAVE LOOKED FORWARD WITH ENJOYMENT TO THINGS: AS MUCH AS I EVER DID
I HAVE FELT SAD OR MISERABLE: NO, NOT AT ALL

## 2024-09-17 NOTE — PROGRESS NOTES
Postpartum Visit Impressions      here for postpartum visit after vaginal delivery, complicated by PPH requiring D&C and CAMI and needing blood transfusion    Postpartum care  - postpartum precautions reviewed, including PP depression  - feeding:  breastfeeding/pumping  - pelvic floor therapy referral:  Yes    Pregnancy Comorbidities/Conditions  - gHTN, no meds --> return in 2-3 months to monitor BP   - PPH --> OK to stop iron supplementation, continue with PNVs  - vaginal varicosities --> need exam to evaluate (exam deferred at time of postpartum visit)    Contraception  - discussed importance of appropriate birth spacing  - desires to use oral progesterone-only contraceptive for birth control.  Discussed Slynd, but does not appear to be covered by insurance.  Rx for minipill    Preventive health  - last Pap 2023 --> due for repeat Pap   - flu vaccine today        ---------------------------------------------------------------  HPI    38 y.o.  presenting for postpartum follow-up     Delivery Date: 2024  GA at Delivery: 38+  Type of Delivery: Vaginal, Spontaneous       Pregnancy Problems (from 24 to present)       Problem Noted Resolved    Priority:  Medium      Vaginal bleeding in pregnancy ([facility]) 2024 by Estefany Kraft MD No    Priority:  Medium      Overview Signed 2024  8:00 AM by Raven Diallo MD     - admitted  for large volume bleeding at home, bleeding scant in hospital with normal labs  - placenta low-lying (~1.3mm from os, limited measurements on TVUS ). Previa resolved.          Obstetric varicose veins, third trimester ([facility]) 2024 by Kelly Regan MD No    Priority:  Medium      Overview Signed 2024  1:59 PM by Kelly Regan MD     Pelvic MRI on 24 to assess anatomy         History of postpartum hypertension 2024 by Kelly Regan MD No    Generalized anxiety disorder 2024 by Yoko Singh, PhD No    Priority:   Medium            Doing well postpartum  States this has been easier than compared to after P1  Pain in bilateral lower quadrants - feels like cramping.      Menses: No  Sexual Intimacy: No  Contraceptive Method: POP  Feeding Method: She is breast feeding exclusively. no breast or nursing problems  Lactation Consult Needed?: No  Mood:   described as good      Physical Exam  Constitutional:       Appearance: Normal appearance.   HENT:      Head: Normocephalic and atraumatic.   Pulmonary:      Effort: Pulmonary effort is normal.   Musculoskeletal:      Cervical back: Neck supple.   Neurological:      General: No focal deficit present.      Mental Status: She is alert.   Skin:     General: Skin is warm.   Psychiatric:         Mood and Affect: Mood normal.

## 2024-09-26 ENCOUNTER — APPOINTMENT (OUTPATIENT)
Dept: BEHAVIORAL HEALTH | Facility: CLINIC | Age: 39
End: 2024-09-26
Payer: COMMERCIAL

## 2024-09-26 DIAGNOSIS — F42.8 OBSESSIVE COMPULSIVE NEUROSIS: ICD-10-CM

## 2024-09-26 DIAGNOSIS — F41.1 GENERALIZED ANXIETY DISORDER: Primary | ICD-10-CM

## 2024-09-26 PROCEDURE — 90837 PSYTX W PT 60 MINUTES: CPT | Performed by: PSYCHOLOGIST

## 2024-09-26 NOTE — PROGRESS NOTES
"START TIME: 10:00 a  END TIME: 10:53  TOTAL TIME FACE TO FACE: 53mins    Subjective   Patient ID: Winston Khan is a 38 y.o. female who presents for   Problem List Items Addressed This Visit       Obsessive compulsive neurosis    Generalized anxiety disorder - Primary   .    Virtual or Telephone Consent    An interactive audio and video telecommunication system which permits real time communications between the patient (at the originating site) and provider (at the distant site) was utilized to provide this telehealth service.   Verbal consent was requested and obtained from Winston Khan on this date, 09/26/24 for a telehealth visit.      CONTENT OF SESSION:   This was a followup appointment (session 15) between provider and patient to address symptoms of postpartum anxiety and OCD. winston is still seeing Dr. Walsh for medication management with positive effect.     Focus of session was on Winston's postpartum psychological adjustment.  Winston is about 8 weeks postpartum (with baby boy, \"Aquilino\").      Winston reported continued generally positive adjustment in postpartum, but noted some anxiety related to feeling restless and difficulty with decision making. She also acknowledged some anxiety in anticipation of return to work.  Time was spent discussing strategies to ensure getting sufficient sleep and gradual adjustment to changes (e.g., practice morning routine a few times before RTW). Winston identified some unrealistic or unhelpful worry thoughts and rumination thought patterns. Provider used Socratic questioning to facilitate cognitive restructuring of potential unrealistic or unhelpful cognitions and patient was able to engage in cognitive restructuring.  Also discussed cognitive defusion for managing rumination.  Provider gave supportive counseling and normalized common emotional experiences.    Objective   Social History     Substance and Sexual Activity   Alcohol Use Not Currently    " Alcohol/week: 3.0 standard drinks of alcohol    Types: 3 Standard drinks or equivalent per week      Social History     Social History Narrative    Not on file        Assessment/Plan   Problem List Items Addressed This Visit       Obsessive compulsive neurosis    Generalized anxiety disorder - Primary     PLAN: Plan made for individual CBT to address OCD on monthly basis. She will continue with Dr. Travis Walsh for medication management.

## 2024-09-30 ENCOUNTER — APPOINTMENT (OUTPATIENT)
Dept: PRIMARY CARE | Facility: CLINIC | Age: 39
End: 2024-09-30
Payer: COMMERCIAL

## 2024-09-30 ENCOUNTER — LAB (OUTPATIENT)
Dept: LAB | Facility: LAB | Age: 39
End: 2024-09-30
Payer: COMMERCIAL

## 2024-09-30 VITALS
HEART RATE: 72 BPM | BODY MASS INDEX: 27.49 KG/M2 | WEIGHT: 165 LBS | OXYGEN SATURATION: 98 % | SYSTOLIC BLOOD PRESSURE: 102 MMHG | HEIGHT: 65 IN | DIASTOLIC BLOOD PRESSURE: 67 MMHG | RESPIRATION RATE: 18 BRPM

## 2024-09-30 DIAGNOSIS — Z00.00 ANNUAL PHYSICAL EXAM: Primary | ICD-10-CM

## 2024-09-30 DIAGNOSIS — E55.9 VITAMIN D DEFICIENCY: ICD-10-CM

## 2024-09-30 DIAGNOSIS — Z00.00 ANNUAL PHYSICAL EXAM: ICD-10-CM

## 2024-09-30 PROBLEM — Z87.59 HISTORY OF POSTPARTUM HYPERTENSION: Status: RESOLVED | Noted: 2024-04-09 | Resolved: 2024-09-30

## 2024-09-30 PROBLEM — Z34.93 THIRD TRIMESTER PREGNANCY (HHS-HCC): Status: RESOLVED | Noted: 2024-07-08 | Resolved: 2024-09-30

## 2024-09-30 PROBLEM — Z86.79 HISTORY OF POSTPARTUM HYPERTENSION: Status: RESOLVED | Noted: 2024-04-09 | Resolved: 2024-09-30

## 2024-09-30 PROBLEM — O09.523 MULTIGRAVIDA OF ADVANCED MATERNAL AGE IN THIRD TRIMESTER (HHS-HCC): Status: RESOLVED | Noted: 2024-03-08 | Resolved: 2024-09-30

## 2024-09-30 PROBLEM — O44.40 LOW LYING PLACENTA, ANTEPARTUM (HHS-HCC): Status: RESOLVED | Noted: 2024-04-02 | Resolved: 2024-09-30

## 2024-09-30 PROBLEM — O22.03: Status: RESOLVED | Noted: 2024-05-08 | Resolved: 2024-09-30

## 2024-09-30 PROBLEM — O46.90 VAGINAL BLEEDING IN PREGNANCY (HHS-HCC): Status: RESOLVED | Noted: 2024-06-16 | Resolved: 2024-09-30

## 2024-09-30 LAB
25(OH)D3 SERPL-MCNC: 42 NG/ML (ref 30–100)
ALBUMIN SERPL BCP-MCNC: 4.4 G/DL (ref 3.4–5)
ALP SERPL-CCNC: 65 U/L (ref 33–110)
ALT SERPL W P-5'-P-CCNC: 25 U/L (ref 7–45)
ANION GAP SERPL CALC-SCNC: 11 MMOL/L (ref 10–20)
AST SERPL W P-5'-P-CCNC: 20 U/L (ref 9–39)
BILIRUB SERPL-MCNC: 0.3 MG/DL (ref 0–1.2)
BUN SERPL-MCNC: 15 MG/DL (ref 6–23)
CALCIUM SERPL-MCNC: 9.7 MG/DL (ref 8.6–10.6)
CHLORIDE SERPL-SCNC: 106 MMOL/L (ref 98–107)
CHOLEST SERPL-MCNC: 213 MG/DL (ref 0–199)
CHOLESTEROL/HDL RATIO: 3.4
CO2 SERPL-SCNC: 27 MMOL/L (ref 21–32)
CREAT SERPL-MCNC: 0.64 MG/DL (ref 0.5–1.05)
EGFRCR SERPLBLD CKD-EPI 2021: >90 ML/MIN/1.73M*2
ERYTHROCYTE [DISTWIDTH] IN BLOOD BY AUTOMATED COUNT: 12.2 % (ref 11.5–14.5)
EST. AVERAGE GLUCOSE BLD GHB EST-MCNC: 103 MG/DL
GLUCOSE SERPL-MCNC: 83 MG/DL (ref 74–99)
HBA1C MFR BLD: 5.2 %
HCT VFR BLD AUTO: 43.1 % (ref 36–46)
HDLC SERPL-MCNC: 62.8 MG/DL
HGB BLD-MCNC: 13.8 G/DL (ref 12–16)
LDLC SERPL CALC-MCNC: 125 MG/DL
MCH RBC QN AUTO: 29.2 PG (ref 26–34)
MCHC RBC AUTO-ENTMCNC: 32 G/DL (ref 32–36)
MCV RBC AUTO: 91 FL (ref 80–100)
NON HDL CHOLESTEROL: 150 MG/DL (ref 0–149)
NRBC BLD-RTO: 0 /100 WBCS (ref 0–0)
PLATELET # BLD AUTO: 281 X10*3/UL (ref 150–450)
POTASSIUM SERPL-SCNC: 4.6 MMOL/L (ref 3.5–5.3)
PROT SERPL-MCNC: 7 G/DL (ref 6.4–8.2)
RBC # BLD AUTO: 4.72 X10*6/UL (ref 4–5.2)
SODIUM SERPL-SCNC: 139 MMOL/L (ref 136–145)
TRIGL SERPL-MCNC: 126 MG/DL (ref 0–149)
TSH SERPL-ACNC: 1.18 MIU/L (ref 0.44–3.98)
VIT B12 SERPL-MCNC: 327 PG/ML (ref 211–911)
VLDL: 25 MG/DL (ref 0–40)
WBC # BLD AUTO: 6.4 X10*3/UL (ref 4.4–11.3)

## 2024-09-30 PROCEDURE — 85027 COMPLETE CBC AUTOMATED: CPT

## 2024-09-30 PROCEDURE — 83036 HEMOGLOBIN GLYCOSYLATED A1C: CPT

## 2024-09-30 PROCEDURE — 3008F BODY MASS INDEX DOCD: CPT | Performed by: FAMILY MEDICINE

## 2024-09-30 PROCEDURE — 84443 ASSAY THYROID STIM HORMONE: CPT

## 2024-09-30 PROCEDURE — 82306 VITAMIN D 25 HYDROXY: CPT

## 2024-09-30 PROCEDURE — 36415 COLL VENOUS BLD VENIPUNCTURE: CPT

## 2024-09-30 PROCEDURE — 99395 PREV VISIT EST AGE 18-39: CPT | Performed by: FAMILY MEDICINE

## 2024-09-30 PROCEDURE — 1036F TOBACCO NON-USER: CPT | Performed by: FAMILY MEDICINE

## 2024-09-30 PROCEDURE — 82607 VITAMIN B-12: CPT

## 2024-09-30 PROCEDURE — 80061 LIPID PANEL: CPT

## 2024-09-30 PROCEDURE — 80053 COMPREHEN METABOLIC PANEL: CPT

## 2024-09-30 ASSESSMENT — PATIENT HEALTH QUESTIONNAIRE - PHQ9
1. LITTLE INTEREST OR PLEASURE IN DOING THINGS: NOT AT ALL
2. FEELING DOWN, DEPRESSED OR HOPELESS: NOT AT ALL
SUM OF ALL RESPONSES TO PHQ9 QUESTIONS 1 AND 2: 0

## 2024-09-30 ASSESSMENT — COLUMBIA-SUICIDE SEVERITY RATING SCALE - C-SSRS
2. HAVE YOU ACTUALLY HAD ANY THOUGHTS OF KILLING YOURSELF?: NO
1. IN THE PAST MONTH, HAVE YOU WISHED YOU WERE DEAD OR WISHED YOU COULD GO TO SLEEP AND NOT WAKE UP?: NO

## 2024-09-30 NOTE — PROGRESS NOTES
"Subjective   Patient ID: Norah Khan is a 38 y.o. female who presents for Annual Exam.    Last Annual Physical: Sept 2023  Last Dental Visit: Up-to-date   Last Eye exam: Up-to-date   Hearing Concerns: No   Diet: Well- balanced   Exercise Routine: Some exercise       HPI   The patient reports that she recently started taking OCPs. She is also taking Prozac for anxiety as prescribed and is tolerating it well. She is currently breastfeeding and is 1 1/2 months post- partum. She has no major concerns at this time.    Review of Systems  Constitutional: No fever or chills, No Night Sweats  Eyes: No Blurry Vision or Eye sight problems  ENT: No Nasal Discharge, Hoarseness, sore throat  Cardiovascular: no chest pain, no palpitations and no syncope.   Respiratory: no cough, no shortness of breath during exertion and no shortness of breath at rest.   Gastrointestinal: no abdominal pain, no nausea and no vomiting.   : No vaginal discharge, burning with urination, no blood in urine or stools  Skin: No Skin rashes or Lesions  Neuro: No Headache, no dizziness or Numbness or tingling  Psych: No Anxiety, depression or sleeping problems  Heme: No Easy bleeding or brusing.     Objective   /67 (BP Location: Left arm, Patient Position: Sitting)   Pulse 72   Resp 18   Ht 1.651 m (5' 5\")   Wt 74.8 kg (165 lb)   LMP 11/09/2023 (Exact Date)   SpO2 98%   Breastfeeding Yes   BMI 27.46 kg/m²     Physical Exam  Constitutional: Alert and in no acute distress. Well developed, well nourished.   Head and Face: Head and face: Normal.    Eyes: Normal external exam. Pupils were equal in size, round, reactive to light (PERRL) with normal accommodation and extraocular movements intact (EOMI).   Ears, Nose, Mouth, and Throat: External inspection of ears and nose: Normal.  Hearing: Normal.  Nasal mucosa, septum, and turbinates: Normal.  Lips, teeth, and gums: Normal.  Oropharynx: Normal.   Neck: No neck mass was observed. Supple. " Thyroid not enlarged and there were no palpable thyroid nodules.   Cardiovascular: Heart rate and rhythm were normal, normal S1 and S2. Pedal pulses: Normal. No peripheral edema.   Pulmonary: No respiratory distress. Clear bilateral breath sounds.   Abdomen: Soft nontender; no abdominal mass palpated. Normal bowel sounds. No organomegaly.   Musculoskeletal: No joint swelling seen, normal movements of all extremities. Range of motion: Normal.  Muscle strength/tone: Normal.    Skin: Normal skin color and pigmentation, normal skin turgor, and no rash.   Neurologic: Deep tendon reflexes were 2+ and symmetric.   Psychiatric: Judgment and insight: Intact. Mood and affect: Normal.  Lymphatic: No cervical lymphadenopathy. Palpation of lymph nodes in axillae: Normal.  Palpation of lymph nodes in groin: Normal.    Lab Results   Component Value Date    WBC 10.7 08/02/2024    HGB 8.8 (L) 08/02/2024    HCT 26.7 (L) 08/02/2024     (L) 08/02/2024    CHOL 215 (H) 01/23/2024    TRIG 174 (H) 01/23/2024    HDL 62.4 01/23/2024    ALT 10 01/23/2024    AST 12 01/23/2024     (L) 01/23/2024    K 4.0 01/23/2024     01/23/2024    CREATININE 0.46 (L) 01/23/2024    BUN 9 01/23/2024    CO2 23 01/23/2024    TSH 0.80 01/23/2024    INR 1.0 08/02/2024    HGBA1C 5.2 01/23/2024       MR pelvis wo IV contrast  Narrative: Interpreted By:  Abel Harris and Calo Sean-Matthew   STUDY:  MR PELVIS WO IV CONTRAST;  7/24/2024 4:10 pm      INDICATION:  Signs/Symptoms:patient 32+ wks pregnant with vaginal varicosities -  want to delineate the extent of them..      COMPARISON:  None.      ACCESSION NUMBER(S):  [id number]      ORDERING CLINICIAN:  CHAD BARNARD      TECHNIQUE:  Multiplanar MRI of the pelvis was obtained including axial, sagittal  and coronal T2 weighted SSFSE, (para)axial, (para)coronal and  sagittal T2 FSE , axial DWI, T1 GRE sequences in 3 planes. No  intravenous contrast was administered. Vaginal gel was  administered.      FINDINGS:  UTERUS:  A partially visualized gravid uterus with a single fetus in cephalic  position is noted. Please note that this study is not tailored for  evaluation of fetal anatomy. The placenta is positioned anteriorly  with the inferior edge within a proximally 3 cm of the internal  cervical os. The cervix is closed and measures 2.9 cm in length.      Prominent, tortuous varicose vessels are noted within the  paracervical region bilaterally as well as involving the bilateral  internal pudendal veins. Diffusely increased T2 signal hyperintensity  is noted along the submucosa of the vagina, limited in evaluation on  this noncontrast exam, but favored to represent submucosal varices.      OVARIES/ADNEXA:      RIGHT:  The right ovary is not visualized, likely out of field-of-view. No  adnexal mass identified.      LEFT:  The left ovary is not visualized, likely out of field-of-view. No  adnexal mass identified.      PERITONEAL FLUID:  No  free or loculated fluid collections are evident in the pelvis.      PELVIC LYMPH NODES:  No abnormally enlarged pelvic lymph nodes are identified.      URINARY BLADDER:  The urinary bladder is partially collapsed, limited for evaluation.      BOWEL:  Within normal limits.      BONES:  No focal lesions are noted in the bone.      Impression: 1. Limited assessment of the vessels due to lack of intravenous  contrast. Within these limitations, prominent varicose veins are  noted within the bilateral paracervical regions, internal pudendal  veins, and likely submucosa of the vagina.  2. Partially visualized gravid uterus with single fetus as described  above.      I personally reviewed the images/study and I agree with the findings  as stated by Ellie Saab MD. This study was interpreted at  [facility], [place], [place].      MACRO:  None      Signed by: Abel Tirado 7/26/2024 10:48 AM  Dictation workstation:    [id number]      Assessment/Plan   Diagnoses and all orders for this visit:  Annual physical exam  -     CBC; Future  -     Comprehensive Metabolic Panel; Future  -     Hemoglobin A1C; Future  -     TSH with reflex to Free T4 if abnormal; Future  -     Lipid Panel; Future  Vitamin D deficiency  -     Vitamin B12; Future  -     Vitamin D 25-Hydroxy,Total (for eval of Vitamin D levels); Future        Dear Norah Khan     It was my pleasure to take care of you today in the office. Below are the things we discussed today:    1. 1. Immunizations: Yearly Flu shot is recommended. Up-to-date         a: COVID: Please get booster from the pharmacy         b: Tetanus: Up-to-date    2. Blood Work: Ordered  3. Seen your dentist twice a year  4. Yearly Eye exam is recommended    5. BMI: Overweight  6: Diet recommendations:   Eat Clean, Try to have as many home cooked meals as possible  Avoid processed foods which contain excess calories, sugar, and sodium.    7. Exercise recommendations:   150 minutes a week to maintain your weight     If you have to loose weight, you need a better diet and exercise plan.     8. PAP - Up-to-date. Last done in July 2023. Cytology and HPV negative.    9. Please get your Living will / Advance directive completed if you do not have one already. Please make sure our office has a copy of the latest one.     10. Birth control: The patient is taking OCPs.     11. Anxiety: The patient is on Prozac and is following up with Psych.    Follow up in one year for a Physical. Please call the office before your Physical to see if you need blood work completed prior to your physical.     Please call me if any questions arise from now until your next visit. I will call you after I am done seeing patients. A Doctor is always available by phone when the office is closed. Please feel free to call for help with any problem that you feel shouldn't wait until the office re-opens.     Scribe Attestation  By signing  my name below, I, Lalito Ivey   attest that this documentation has been prepared under the direction and in the presence of Carlie France MD.

## 2024-10-24 ENCOUNTER — APPOINTMENT (OUTPATIENT)
Dept: BEHAVIORAL HEALTH | Facility: CLINIC | Age: 39
End: 2024-10-24
Payer: COMMERCIAL

## 2024-11-01 ENCOUNTER — APPOINTMENT (OUTPATIENT)
Dept: OBSTETRICS AND GYNECOLOGY | Facility: CLINIC | Age: 39
End: 2024-11-01
Payer: COMMERCIAL

## 2024-11-21 ENCOUNTER — TELEMEDICINE (OUTPATIENT)
Dept: BEHAVIORAL HEALTH | Facility: CLINIC | Age: 39
End: 2024-11-21
Payer: COMMERCIAL

## 2024-11-21 DIAGNOSIS — F41.1 GENERALIZED ANXIETY DISORDER: Primary | ICD-10-CM

## 2024-11-21 DIAGNOSIS — F42.8 OBSESSIVE COMPULSIVE NEUROSIS: ICD-10-CM

## 2024-11-21 PROCEDURE — 90837 PSYTX W PT 60 MINUTES: CPT | Performed by: PSYCHOLOGIST

## 2024-11-21 NOTE — PROGRESS NOTES
"START TIME: 1:05   END TIME: 2  TOTAL TIME FACE TO FACE: 55mins    Subjective   Patient ID: Winston Khan is a 39 y.o. female who presents for   Problem List Items Addressed This Visit       Obsessive compulsive neurosis    Generalized anxiety disorder - Primary   .    Virtual or Telephone Consent    An interactive audio and video telecommunication system which permits real time communications between the patient (at the originating site) and provider (at the distant site) was utilized to provide this telehealth service.   Verbal consent was requested and obtained from Winston Khan on this date, 11/21/24 for a telehealth visit.      CONTENT OF SESSION:   This was a followup appointment (session 16) between provider and patient to address symptoms of postpartum anxiety and OCD. winston is still seeing Dr. Walsh for medication management with positive effect.     Focus of session was on Winston's postpartum psychological adjustment.  Winston is about 8 weeks postpartum (with baby boy, \"Aquilino\").       Winston reported continued generally positive adjustment in postpartum. She returned to work about a month ago and is navigating adjustment of this new schedule in context of postpartum, including pumping breastmilk at work and trying to keep supply up. She had a presenting concern of distress related to impact of postpartum on sexual function (e.g., increased fatigue; decreased opportunities for intimacy with partner). Provider gave psychoeducation on common psychological and sexual adjustments in postpartum. Time was spent on identification of potential psychological and interpersonal contributing factors for Winston's experience with affected sexual function. She made a plan for assertive communication and strategies to increase sleep.  Provider gave supportive counseling and normalized common emotional experiences.    Objective   Social History     Substance and Sexual Activity   Alcohol Use Not " Currently    Alcohol/week: 3.0 standard drinks of alcohol    Types: 3 Standard drinks or equivalent per week      Social History     Social History Narrative    Not on file        Assessment/Plan   Problem List Items Addressed This Visit       Obsessive compulsive neurosis    Generalized anxiety disorder - Primary     PLAN: Plan made for individual CBT to address OCD on monthly basis. She will continue with Dr. Travis Walsh for medication management.

## 2024-12-02 ENCOUNTER — ROUTINE PRENATAL (OUTPATIENT)
Dept: OBSTETRICS AND GYNECOLOGY | Facility: CLINIC | Age: 39
End: 2024-12-02
Payer: COMMERCIAL

## 2024-12-02 DIAGNOSIS — Z01.419 WOMEN'S ANNUAL ROUTINE GYNECOLOGICAL EXAMINATION: Primary | ICD-10-CM

## 2024-12-02 DIAGNOSIS — Z30.41 ENCOUNTER FOR SURVEILLANCE OF CONTRACEPTIVE PILLS: Primary | ICD-10-CM

## 2024-12-02 PROCEDURE — 99395 PREV VISIT EST AGE 18-39: CPT | Performed by: OBSTETRICS & GYNECOLOGY

## 2024-12-02 ASSESSMENT — PAIN - FUNCTIONAL ASSESSMENT: PAIN_FUNCTIONAL_ASSESSMENT: 0-10

## 2024-12-02 ASSESSMENT — ENCOUNTER SYMPTOMS
CONSTITUTIONAL NEGATIVE: 0
RESPIRATORY NEGATIVE: 0
CARDIOVASCULAR NEGATIVE: 0
GASTROINTESTINAL NEGATIVE: 0
NEUROLOGICAL NEGATIVE: 0
MUSCULOSKELETAL NEGATIVE: 0
ENDOCRINE NEGATIVE: 0
ALLERGIC/IMMUNOLOGIC NEGATIVE: 0
EYES NEGATIVE: 0
HEMATOLOGIC/LYMPHATIC NEGATIVE: 0
PSYCHIATRIC NEGATIVE: 0

## 2024-12-02 ASSESSMENT — PAIN SCALES - GENERAL: PAINLEVEL_OUTOF10: 0 - NO PAIN

## 2024-12-02 NOTE — PROGRESS NOTES
Norah Dowjoão 39 y.o. y/o who presents for annual gyn exam.      Preventive health  Cervical cancer screening:  due in 2028, last in 2023 NILM/neg HPV  Breast cancer screening age 42  Colon cancer screening age 47    History of gHTN  Normotensive today  Discussed heart healthy lifestyle as hypertensive disorders in pregnancy are risk factors/signs of increased risk of CV disease later in life.      Reproductive Life Planning  Using POPs, Rx switched to Slynd    -------------------------------------  HPI      Here for annual  Back to work  Sleeping  Waking up at 4, but sleeps from 8 pm - 4 am    Mood described as good  Still talks to Dr. Singh and Dr. Walsh    Using mini pill for contraception  No bleeding      History reviewed and updated in patient's History Tab        Vitals:    12/02/24 0847   BP: 130/84   Pulse: 81       Physical Exam  Constitutional:       Appearance: Normal appearance.   Genitourinary:      Vulva normal.      Genitourinary Comments: No evidence of any vaginal or vulvar varicosities.     HENT:      Head: Normocephalic and atraumatic.   Pulmonary:      Effort: Pulmonary effort is normal.   Musculoskeletal:      Cervical back: Neck supple.   Neurological:      General: No focal deficit present.      Mental Status: She is alert.   Skin:     General: Skin is warm and dry.   Psychiatric:         Mood and Affect: Mood normal.

## 2024-12-03 VITALS — HEART RATE: 81 BPM

## 2024-12-03 PROBLEM — F41.1 GENERALIZED ANXIETY DISORDER: Status: RESOLVED | Noted: 2024-01-19 | Resolved: 2024-12-03

## 2025-01-10 ENCOUNTER — APPOINTMENT (OUTPATIENT)
Dept: BEHAVIORAL HEALTH | Facility: CLINIC | Age: 40
End: 2025-01-10
Payer: COMMERCIAL

## 2025-01-10 DIAGNOSIS — F42.8 OBSESSIVE COMPULSIVE NEUROSIS: ICD-10-CM

## 2025-01-10 DIAGNOSIS — F41.1 GENERALIZED ANXIETY DISORDER: ICD-10-CM

## 2025-01-10 PROCEDURE — 99214 OFFICE O/P EST MOD 30 MIN: CPT | Performed by: STUDENT IN AN ORGANIZED HEALTH CARE EDUCATION/TRAINING PROGRAM

## 2025-01-10 NOTE — PROGRESS NOTES
"Subjective    All Individuals Present: Patient and Provider (Encounter Provider)     ID: Norah Khan is a 39 y.o. female with history of OCD and anxiety.     Interval History/HPI/PFSH:  Delivered 8/1/24, Emmanuel, breastfeeding, eating well and growing well.    Had good holidays. Barb started getting into Angella and made it fun.    Sleep is still tricky with Aquilino, not as consistent due to some viral illnesses. Energy during the day has been fair.    Overall feels like her mood and anxiety are stable and under control. Sometimes gets overstimulated if both kids are dysregulated    Back at work since end of October. Pumping at work, supply is still good. Feels good about making it to her goal of 6 months of breastfeeding.    Has family trip planned to [facility] in February with extended family    On norethindrone for birth control, insurance wouldn't cover drosperinone.    Denies SI/HI/AVH.       Medication side effects: None Reported     Review of Systems  Constitutional: Positive for fatigue, Negative for fevers and weight loss  Psychiatric: Positive for fatigue and sleep disturbance, Negative for anxiety, decreased appetite, depression, irritability, loss of interest in favorite activities, mood swings, and sleep disturbance  Neurological: Negative   Other: @5 months PP    Objective   There were no vitals taken for this visit.  Wt Readings from Last 4 Encounters:   09/30/24 74.8 kg (165 lb)   09/16/24 74.4 kg (164 lb)   08/01/24 79.3 kg (174 lb 13.2 oz)   07/24/24 80.3 kg (177 lb)       Mental Status Exam  General Appearance: Well groomed, appropriate eye contact.  Attitude/Behavior: Cooperative, conversant, engaged, and with good eye contact.  Motor: No psychomotor agitation or retardation, no tremor or other abnormal movements.  Speech: Normal rate, volume, prosody  Gait/Station: Within normal limits  Mood: \"good\"  Affect: Euthymic, full-range and Congruent with mood and topic of conversation  Thought " Process: Linear, goal directed  Thought Associations: No loosening of associations  Thought Content: normal  Sensorium: Alert and oriented to person, place, time and situation  Insight: Intact, as evidenced by awareness of symptom patterns  Judgment: Intact  Cognition: Cognitively intact to conversational testing with respect to attention, orientation, fund of knowledge, recent and remote memory, and language.  Testing: N/A.    Laboratory/Imaging/Diagnostic Tests       Assessment/Plan   Overall Formulation and Differential Diagnosis:  Norah Khan is a 39 y.o. female who meets criteria for OCD and MELA.  Interval Assessment:  Past psychiatric history of OCD and anxiety with postpartum exacerbation, now 4 weeks postpartum (delivery date 8/1/24) who [facility] for psychiatric follow-up in current pregnancy. She is prescribed Fluoxetine 60mg PO daily for management of symptoms.      3/13/24: OCD symptoms, mood well-controlled. @18wga, some fatigue and nausea with pregnancy. No need for fluoxetine dose change currently, will continue to monitor as pregnancy progresses.    4/10/24: @21wga. Stable, euthymic, OCD symptoms well-controlled. No need for medication changes.    8/29/24: 4 weeks PP, stable and euthymic. No need for changes.    1/10/25: 5 months PP, remains stable, no need for changes.     Suicide/Violence Risk Assessment:  Although patient has risk factor of anxiety, patient has multiple protective factors including child-related concerns, treatment adherence, employment, social supports, help-seeking behavior, no prior suicide attempts, no prior history of homicidal violence, no firearms access, absence of substance use disorder, absence of suicidal ideations, absence of homicidal ideations, and future orientation, making the patient's risk of harm to self or others acutely low and chronically low.      Impression:  OCD with peripartum exacerbation  Anxiety disorder,  unspecified     PLAN:  - Continue Fluoxetine 60 mg PO daily for OCD and anxiety symptoms. Denies adverse effects. 90 day script with refills available at pt's outpatient pharmacy.   - Continue psychotherapy with Dr. Yoko Signh (exposure-response prevention therapy) as schedule allows, currently seeing monthly  - Provided psychoeducation regarding psychiatric diagnoses, medications, and indicated treatments     FOLLOW-UP: 12 weeks, or sooner if new or worsening symptoms  Risk Assessment:  Imminent Risk of Suicide or Serious Self-Injury: Low Risk -- Risk factors include: Severe anxiety Protective factors include:Denies current suicidal ideation, Denies history of suicide attempts , Future-oriented talk , Willingness to seek help and support , Skills in problem solving, conflict resolution, and nonviolent handling of disputes, Cultural and Restorationism beliefs that discourage suicide and support self-preservation , Access to a variety of clinical interventions , Receiving and engaged in care for mental, physical, and substance use disorders , History of adhering to treatment recommendations and/or prescribed medication regimen , Support through ongoing medical and mental healthcare relationships , Current/history of good response to treatment/meds , Interpersonal relationships and supports, e.g., family, friends, peers, community , and Restricted access to firearms or other lethal means of suicide   Imminent Risk of Violence or Homicide: Low Risk - Risk factors include: No significant risk factors identified on screening. Protective factors include: Lack of known history of harm to others , Lack of known history of violent ideation , Lack of known access to firearms , Sense of community, availability/access to resources and support , Sense of optimism, hope , Interpersonal competence , Affect regulation , Sense of self-efficacy, internal locus of control , and Positive, pro-social family/peer network   Treatment  Plan:  There are no recently modified care plans to display for this patient.      Attestation Statements   Topics (in addition those noted above): N/A - E&M coding by complexity of care. , Diagnostic impressions, Importance of adherence to treatment , Instructions for treatment , Patient education , Risks and benefits of treatments , and Side effects of medications

## 2025-04-04 ENCOUNTER — PATIENT MESSAGE (OUTPATIENT)
Dept: OBSTETRICS AND GYNECOLOGY | Facility: CLINIC | Age: 40
End: 2025-04-04
Payer: COMMERCIAL

## 2025-04-10 ENCOUNTER — APPOINTMENT (OUTPATIENT)
Dept: BEHAVIORAL HEALTH | Facility: CLINIC | Age: 40
End: 2025-04-10
Payer: COMMERCIAL

## 2025-04-10 DIAGNOSIS — F41.1 GENERALIZED ANXIETY DISORDER: ICD-10-CM

## 2025-04-10 DIAGNOSIS — F42.8 OBSESSIVE COMPULSIVE NEUROSIS: ICD-10-CM

## 2025-04-10 PROCEDURE — 99214 OFFICE O/P EST MOD 30 MIN: CPT | Performed by: STUDENT IN AN ORGANIZED HEALTH CARE EDUCATION/TRAINING PROGRAM

## 2025-04-10 NOTE — PROGRESS NOTES
"Subjective    All Individuals Present: Patient and Provider (Encounter Provider)     ID: Norah Khan is a 39 y.o. female with history of OCD and anxiety.     Interval History/HPI/PFSH:  Delivered 8/1/24, Emmanuel, breastfeeding, eating well and growing well. When she is pumping, does start to worry that she is missing work or feeling rushed, not based in comments that others made, purely her own guilt.     Feels like things are going by so fast. Sleep for baby is slowly improving, only waking once in the night. He is healthy and developing well. He is in  (same as Barb).    Work going well, starting to travel occasionally for work.    Pt has no problems going to sleep-- thinking plenty before bed but no issues falling asleep easily, will wake up with some anxiety and feels like mostly planning for the day.    Mood remains stable, euthymic.    On norethindrone for birth control, insurance wouldn't cover drosperinone.    Denies SI/HI/AVH.       Medication side effects: None Reported     Review of Systems  Constitutional: Positive for fatigue, Negative for fevers and weight loss  Psychiatric: Positive for fatigue and sleep disturbance, Negative for anxiety, decreased appetite, depression, irritability, loss of interest in favorite activities, mood swings, and sleep disturbance  Neurological: Negative   Other: @8 months PP    Objective   There were no vitals taken for this visit.  Wt Readings from Last 4 Encounters:   09/30/24 74.8 kg (165 lb)   09/16/24 74.4 kg (164 lb)   08/01/24 79.3 kg (174 lb 13.2 oz)   07/24/24 80.3 kg (177 lb)       Mental Status Exam  General Appearance: Well groomed, appropriate eye contact.  Attitude/Behavior: Cooperative, conversant, engaged, and with good eye contact.  Motor: No psychomotor agitation or retardation, no tremor or other abnormal movements.  Speech: Normal rate, volume, prosody  Gait/Station: Within normal limits  Mood: \"good\"  Affect: Euthymic, full-range and " Congruent with mood and topic of conversation  Thought Process: Linear, goal directed  Thought Associations: No loosening of associations  Thought Content: normal  Sensorium: Alert and oriented to person, place, time and situation  Insight: Intact, as evidenced by awareness of symptom patterns  Judgment: Intact  Cognition: Cognitively intact to conversational testing with respect to attention, orientation, fund of knowledge, recent and remote memory, and language.  Testing: N/A.    Laboratory/Imaging/Diagnostic Tests       Assessment/Plan   Overall Formulation and Differential Diagnosis:  Norah Khan is a 39 y.o. female who meets criteria for OCD and MELA.  Interval Assessment:  Past psychiatric history of OCD and anxiety with postpartum exacerbation, now 4 weeks postpartum (delivery date 8/1/24) who [facility] follow-up in current pregnancy. She is prescribed Fluoxetine 60mg PO daily for management of symptoms.      3/13/24: OCD symptoms, mood well-controlled. @18wga, some fatigue and nausea with pregnancy. No need for fluoxetine dose change currently, will continue to monitor as pregnancy progresses.    4/10/24: @21wga. Stable, euthymic, OCD symptoms well-controlled. No need for medication changes.    8/29/24: 4 weeks PP, stable and euthymic. No need for changes.    1/10/25: 5 months PP, remains stable, no need for changes.    4/10/25: 5 months PP, remains stable, no need for changes.     Suicide/Violence Risk Assessment:  Although patient has risk factor of anxiety, patient has multiple protective factors including child-related concerns, treatment adherence, employment, social supports, help-seeking behavior, no prior suicide attempts, no prior history of homicidal violence, no firearms access, absence of substance use disorder, absence of suicidal ideations, absence of homicidal ideations, and future orientation, making the patient's risk of harm to self or  others acutely low and chronically low.      Impression:  OCD with peripartum exacerbation  Anxiety disorder, unspecified     PLAN:  - Continue Fluoxetine 60 mg PO daily for OCD and anxiety symptoms. Denies adverse effects. 90 day script with refills available at pt's outpatient pharmacy.   - Continue psychotherapy with Dr. Yoko Singh (exposure-response prevention therapy) as schedule allows, currently seeing monthly  - Provided psychoeducation regarding psychiatric diagnoses, medications, and indicated treatments     FOLLOW-UP: 3-4 months, or sooner if new or worsening symptoms  Risk Assessment:  Imminent Risk of Suicide or Serious Self-Injury: Low Risk -- Risk factors include: Severe anxiety Protective factors include:Denies current suicidal ideation, Denies history of suicide attempts , Future-oriented talk , Willingness to seek help and support , Skills in problem solving, conflict resolution, and nonviolent handling of disputes, Cultural and Jewish beliefs that discourage suicide and support self-preservation , Access to a variety of clinical interventions , Receiving and engaged in care for mental, physical, and substance use disorders , History of adhering to treatment recommendations and/or prescribed medication regimen , Support through ongoing medical and mental healthcare relationships , Current/history of good response to treatment/meds , Interpersonal relationships and supports, e.g., family, friends, peers, community , and Restricted access to firearms or other lethal means of suicide   Imminent Risk of Violence or Homicide: Low Risk - Risk factors include: No significant risk factors identified on screening. Protective factors include: Lack of known history of harm to others , Lack of known history of violent ideation , Lack of known access to firearms , Sense of community, availability/access to resources and support , Sense of optimism, hope , Interpersonal competence , Affect regulation ,  Sense of self-efficacy, internal locus of control , and Positive, pro-social family/peer network   Treatment Plan:  There are no recently modified care plans to display for this patient.      Attestation Statements   Topics (in addition those noted above): N/A - E&M coding by complexity of care. , Diagnostic impressions, Importance of adherence to treatment , Instructions for treatment , Patient education , Risks and benefits of treatments , and Side effects of medications

## 2025-06-26 ENCOUNTER — TELEPHONE (OUTPATIENT)
Dept: OBSTETRICS AND GYNECOLOGY | Facility: CLINIC | Age: 40
End: 2025-06-26
Payer: COMMERCIAL

## 2025-06-26 NOTE — TELEPHONE ENCOUNTER
Call from pt states her son scratched her nipple 2 days ago painful with nursing not getting worse or better. Discussed using lanolin or breast milk rub on nipple after feeds and in between let air dry. Pt has number to lactation center and will call if needed.

## 2025-08-08 ENCOUNTER — APPOINTMENT (OUTPATIENT)
Dept: BEHAVIORAL HEALTH | Facility: CLINIC | Age: 40
End: 2025-08-08
Payer: COMMERCIAL

## 2025-08-08 DIAGNOSIS — F42.8 OBSESSIVE COMPULSIVE NEUROSIS: ICD-10-CM

## 2025-08-08 DIAGNOSIS — F41.1 GENERALIZED ANXIETY DISORDER: ICD-10-CM

## 2025-08-08 PROCEDURE — 99214 OFFICE O/P EST MOD 30 MIN: CPT | Performed by: STUDENT IN AN ORGANIZED HEALTH CARE EDUCATION/TRAINING PROGRAM

## 2025-08-08 RX ORDER — FLUOXETINE 20 MG/1
20 CAPSULE ORAL DAILY
Qty: 90 CAPSULE | Refills: 3 | Status: SHIPPED | OUTPATIENT
Start: 2025-08-08 | End: 2026-08-08

## 2025-08-08 RX ORDER — FLUOXETINE HYDROCHLORIDE 40 MG/1
40 CAPSULE ORAL DAILY
Qty: 90 CAPSULE | Refills: 3 | Status: SHIPPED | OUTPATIENT
Start: 2025-08-08 | End: 2026-08-08

## 2025-08-08 NOTE — PROGRESS NOTES
"Subjective    All Individuals Present: Patient and Provider (Encounter Provider)     ID: Norah Khan is a 39 y.o. female with history of OCD and anxiety.     Interval History/HPI/PFSH:    Had 3st birthday party and had a great time. He is sleeping through the night now, kids are playing together now.    Work going well, in a good groove.    Had 2 weekends away with just , including [place] and [place].    Mood remains stable, euthymic. Doesn't want to make any med changes.    On norethindrone for birth control, insurance wouldn't cover drosperinone.    Denies SI/HI/AVH.       Medication side effects: None Reported     Review of Systems  Constitutional: Positive for fatigue, Negative for fevers and weight loss  Psychiatric: Positive for fatigue and sleep disturbance, Negative for anxiety, decreased appetite, depression, irritability, loss of interest in favorite activities, mood swings, and sleep disturbance  Neurological: Negative   Other: @12 months PP    Objective   There were no vitals taken for this visit.  Wt Readings from Last 4 Encounters:   09/30/24 74.8 kg (165 lb)   09/16/24 74.4 kg (164 lb)   08/01/24 79.3 kg (174 lb 13.2 oz)   07/24/24 80.3 kg (177 lb)       Mental Status Exam  General Appearance: Well groomed, appropriate eye contact.  Attitude/Behavior: Cooperative, conversant, engaged, and with good eye contact.  Motor: No psychomotor agitation or retardation, no tremor or other abnormal movements.  Speech: Normal rate, volume, prosody  Gait/Station: Within normal limits  Mood: \"good\"  Affect: Euthymic, full-range and Congruent with mood and topic of conversation  Thought Process: Linear, goal directed  Thought Associations: No loosening of associations  Thought Content: normal  Sensorium: Alert and oriented to person, place, time and situation  Insight: Intact, as evidenced by awareness of symptom patterns  Judgment: Intact  Cognition: Cognitively intact to conversational testing with respect " to attention, orientation, fund of knowledge, recent and remote memory, and language.  Testing: N/A.    Laboratory/Imaging/Diagnostic Tests       Assessment/Plan   Overall Formulation and Differential Diagnosis:  Norah Khan is a 39 y.o. female who meets criteria for OCD and MELA.  Interval Assessment:  Past psychiatric history of OCD and anxiety with postpartum exacerbation, now 4 weeks postpartum (delivery date 8/1/24) who [facility] for psychiatric follow-up in current pregnancy. She is prescribed Fluoxetine 60mg PO daily for management of symptoms.      3/13/24: OCD symptoms, mood well-controlled. @18wga, some fatigue and nausea with pregnancy. No need for fluoxetine dose change currently, will continue to monitor as pregnancy progresses.    4/10/24: @21wga. Stable, euthymic, OCD symptoms well-controlled. No need for medication changes.    8/29/24: 4 weeks PP, stable and euthymic. No need for changes.    1/10/25: 5 months PP, remains stable, no need for changes.    4/10/25: 8 months PP, remains stable, no need for changes.    8/8/25: 1 year PP, stable, no need for changes.     Suicide/Violence Risk Assessment:  Although patient has risk factor of anxiety, patient has multiple protective factors including child-related concerns, treatment adherence, employment, social supports, help-seeking behavior, no prior suicide attempts, no prior history of homicidal violence, no firearms access, absence of substance use disorder, absence of suicidal ideations, absence of homicidal ideations, and future orientation, making the patient's risk of harm to self or others acutely low and chronically low.      Impression:  OCD with peripartum exacerbation  Anxiety disorder, unspecified     PLAN:  - Continue Fluoxetine 60 mg PO daily for OCD and anxiety symptoms. Denies adverse effects. 90 day script with refills available at pt's outpatient pharmacy.   - Continue psychotherapy with Dr. Babcock  Samantha (exposure-response prevention therapy) as schedule allows, currently seeing monthly  - Provided psychoeducation regarding psychiatric diagnoses, medications, and indicated treatments     FOLLOW-UP: 6 months, or sooner if new or worsening symptoms    Risk Assessment:  Imminent Risk of Suicide or Serious Self-Injury: Low Risk -- Risk factors include: Severe anxiety Protective factors include:Denies current suicidal ideation, Denies history of suicide attempts , Future-oriented talk , Willingness to seek help and support , Skills in problem solving, conflict resolution, and nonviolent handling of disputes, Cultural and Mosque beliefs that discourage suicide and support self-preservation , Access to a variety of clinical interventions , Receiving and engaged in care for mental, physical, and substance use disorders , History of adhering to treatment recommendations and/or prescribed medication regimen , Support through ongoing medical and mental healthcare relationships , Current/history of good response to treatment/meds , Interpersonal relationships and supports, e.g., family, friends, peers, community , and Restricted access to firearms or other lethal means of suicide   Imminent Risk of Violence or Homicide: Low Risk - Risk factors include: No significant risk factors identified on screening. Protective factors include: Lack of known history of harm to others , Lack of known history of violent ideation , Lack of known access to firearms , Sense of community, availability/access to resources and support , Sense of optimism, hope , Interpersonal competence , Affect regulation , Sense of self-efficacy, internal locus of control , and Positive, pro-social family/peer network   Treatment Plan:  There are no recently modified care plans to display for this patient.      Attestation Statements   Topics (in addition those noted above): N/A - E&M coding by complexity of care. , Diagnostic impressions, Importance of  adherence to treatment , Instructions for treatment , Patient education , Risks and benefits of treatments , and Side effects of medications

## 2025-08-14 ENCOUNTER — APPOINTMENT (OUTPATIENT)
Dept: BEHAVIORAL HEALTH | Facility: CLINIC | Age: 40
End: 2025-08-14
Payer: COMMERCIAL

## 2025-08-26 DIAGNOSIS — Z30.011 ENCOUNTER FOR INITIAL PRESCRIPTION OF CONTRACEPTIVE PILLS: ICD-10-CM

## 2025-08-28 RX ORDER — NORETHINDRONE 0.35 MG/1
1 TABLET ORAL DAILY
Qty: 84 TABLET | Refills: 3 | Status: SHIPPED | OUTPATIENT
Start: 2025-08-28 | End: 2026-08-28